# Patient Record
Sex: MALE | Race: WHITE | NOT HISPANIC OR LATINO | Employment: FULL TIME | URBAN - METROPOLITAN AREA
[De-identification: names, ages, dates, MRNs, and addresses within clinical notes are randomized per-mention and may not be internally consistent; named-entity substitution may affect disease eponyms.]

---

## 2021-05-25 LAB — CRC RECOMMENDATION EXT: NORMAL

## 2023-01-30 ENCOUNTER — OFFICE VISIT (OUTPATIENT)
Dept: PRIMARY CARE CLINIC | Facility: CLINIC | Age: 63
End: 2023-01-30
Payer: COMMERCIAL

## 2023-01-30 VITALS
HEART RATE: 72 BPM | BODY MASS INDEX: 25.74 KG/M2 | TEMPERATURE: 99 F | OXYGEN SATURATION: 98 % | SYSTOLIC BLOOD PRESSURE: 112 MMHG | DIASTOLIC BLOOD PRESSURE: 70 MMHG | HEIGHT: 70 IN | WEIGHT: 179.81 LBS

## 2023-01-30 DIAGNOSIS — R73.9 HIGH BLOOD SUGAR: ICD-10-CM

## 2023-01-30 DIAGNOSIS — Z11.3 SCREEN FOR STD (SEXUALLY TRANSMITTED DISEASE): ICD-10-CM

## 2023-01-30 DIAGNOSIS — Z11.59 ENCOUNTER FOR HEPATITIS C SCREENING TEST FOR LOW RISK PATIENT: ICD-10-CM

## 2023-01-30 DIAGNOSIS — E78.2 MIXED HYPERLIPIDEMIA: Primary | ICD-10-CM

## 2023-01-30 DIAGNOSIS — E03.9 PRIMARY HYPOTHYROIDISM: ICD-10-CM

## 2023-01-30 PROCEDURE — 99213 PR OFFICE/OUTPT VISIT, EST, LEVL III, 20-29 MIN: ICD-10-PCS | Mod: S$GLB,,, | Performed by: INTERNAL MEDICINE

## 2023-01-30 PROCEDURE — 99213 OFFICE O/P EST LOW 20 MIN: CPT | Mod: S$GLB,,, | Performed by: INTERNAL MEDICINE

## 2023-01-30 PROCEDURE — 3074F PR MOST RECENT SYSTOLIC BLOOD PRESSURE < 130 MM HG: ICD-10-PCS | Mod: S$GLB,,, | Performed by: INTERNAL MEDICINE

## 2023-01-30 PROCEDURE — 3008F BODY MASS INDEX DOCD: CPT | Mod: S$GLB,,, | Performed by: INTERNAL MEDICINE

## 2023-01-30 PROCEDURE — 1160F RVW MEDS BY RX/DR IN RCRD: CPT | Mod: S$GLB,,, | Performed by: INTERNAL MEDICINE

## 2023-01-30 PROCEDURE — 1159F MED LIST DOCD IN RCRD: CPT | Mod: S$GLB,,, | Performed by: INTERNAL MEDICINE

## 2023-01-30 PROCEDURE — 3074F SYST BP LT 130 MM HG: CPT | Mod: S$GLB,,, | Performed by: INTERNAL MEDICINE

## 2023-01-30 PROCEDURE — 3078F PR MOST RECENT DIASTOLIC BLOOD PRESSURE < 80 MM HG: ICD-10-PCS | Mod: S$GLB,,, | Performed by: INTERNAL MEDICINE

## 2023-01-30 PROCEDURE — 1159F PR MEDICATION LIST DOCUMENTED IN MEDICAL RECORD: ICD-10-PCS | Mod: S$GLB,,, | Performed by: INTERNAL MEDICINE

## 2023-01-30 PROCEDURE — 3078F DIAST BP <80 MM HG: CPT | Mod: S$GLB,,, | Performed by: INTERNAL MEDICINE

## 2023-01-30 PROCEDURE — 3008F PR BODY MASS INDEX (BMI) DOCUMENTED: ICD-10-PCS | Mod: S$GLB,,, | Performed by: INTERNAL MEDICINE

## 2023-01-30 PROCEDURE — 1160F PR REVIEW ALL MEDS BY PRESCRIBER/CLIN PHARMACIST DOCUMENTED: ICD-10-PCS | Mod: S$GLB,,, | Performed by: INTERNAL MEDICINE

## 2023-01-30 RX ORDER — SIMVASTATIN 20 MG/1
20 TABLET, FILM COATED ORAL NIGHTLY
COMMUNITY
Start: 2022-12-13 | End: 2023-07-11 | Stop reason: SDUPTHER

## 2023-01-30 RX ORDER — DICLOFENAC SODIUM 10 MG/G
1 GEL TOPICAL 4 TIMES DAILY PRN
COMMUNITY
End: 2023-07-11 | Stop reason: SDUPTHER

## 2023-01-30 RX ORDER — ACYCLOVIR 400 MG/1
400 TABLET ORAL 2 TIMES DAILY
COMMUNITY
Start: 2023-01-21 | End: 2023-04-04

## 2023-01-30 RX ORDER — LEVOTHYROXINE SODIUM 50 UG/1
50 TABLET ORAL
COMMUNITY
End: 2023-07-11 | Stop reason: SDUPTHER

## 2023-01-30 RX ORDER — FINASTERIDE 1 MG/1
1 TABLET, FILM COATED ORAL DAILY
COMMUNITY
End: 2024-01-16 | Stop reason: SDUPTHER

## 2023-01-30 RX ORDER — CELECOXIB 200 MG/1
200 CAPSULE ORAL DAILY
COMMUNITY
End: 2023-03-07 | Stop reason: SDUPTHER

## 2023-01-30 RX ORDER — SILDENAFIL 100 MG/1
100 TABLET, FILM COATED ORAL 2 TIMES DAILY PRN
COMMUNITY

## 2023-01-30 RX ORDER — LIDOCAINE 50 MG/G
1 OINTMENT TOPICAL
COMMUNITY
End: 2023-07-11 | Stop reason: SDUPTHER

## 2023-01-30 RX ORDER — GABAPENTIN 300 MG/1
300 CAPSULE ORAL NIGHTLY
COMMUNITY
End: 2023-06-12 | Stop reason: SDUPTHER

## 2023-01-30 RX ORDER — MINOXIDIL 10 MG/1
1 TABLET ORAL DAILY
COMMUNITY
Start: 2022-12-23 | End: 2024-01-16

## 2023-01-31 NOTE — PROGRESS NOTES
Subjective:       Patient ID: Mitchell Hernandez is a 62 y.o. male.    Chief Complaint: Establish Care (Patient presents for re-establishing care and medication refills.)      Mr Hernandez :  is an established patient who presents today for a f/u visit on thyroid & lipids problems    Thyroid: Patient presents for evaluation of hypothyroidism. Current symptoms include fatigue. Patient denies weight gain.             Review of Systems   Constitutional:  Negative for activity change, appetite change and fever.   Respiratory: Negative.     Cardiovascular: Negative.    Gastrointestinal: Negative.    Musculoskeletal: Negative.        Objective:      Physical Exam  Constitutional:       Appearance: Normal appearance.   Cardiovascular:      Rate and Rhythm: Normal rate and regular rhythm.      Pulses: Normal pulses.      Heart sounds: Normal heart sounds. No murmur heard.    No friction rub. No gallop.   Pulmonary:      Effort: Pulmonary effort is normal.      Breath sounds: Normal breath sounds. No wheezing.   Abdominal:      General: Abdomen is flat. Bowel sounds are normal.      Palpations: Abdomen is soft.   Musculoskeletal:         General: Normal range of motion.   Skin:     General: Skin is warm and dry.   Neurological:      General: No focal deficit present.      Mental Status: He is alert and oriented to person, place, and time.   Psychiatric:         Mood and Affect: Mood normal.       Assessment:       1. Mixed hyperlipidemia  Overview:  Dyslipidemia: Patient presents for evaluation of lipids.  Compliance with treatment thus far has been good.  A repeat fasting lipid profile was done.  The patient does use medications that may worsen dyslipidemias (corticosteroids, progestins, anabolic steroids, diuretics, beta-blockers, amiodarone, cyclosporine, olanzapine). The patient exercises every other day.    The patient is not known to have coexisting coronary artery disease.     Cardiac Risk Factors  Age > 45-male, >  55-female:  YES  +1   Smoking:   No   Sig. family hx of CHD*:  NO   Hypertension:   NO   Diabetes:   NO   HDL < 35:   NO   HDL > 59:   NO   Total: 1   *- Sig. family h/o CHD per NCEP = MI or sudden death at <56yo in   father or other 1st-degree male relative, or <64yo in mother or   other 1st-degree female relative        Assessment & Plan:  Exercise , Diet d/w pt  Recheck lipids in 3M    Orders:  -     Lipid Panel; Future; Expected date: 01/30/2023  -     Comprehensive Metabolic Panel; Future; Expected date: 01/30/2023    2. Primary hypothyroidism  Overview:  Thyroid: Patient presents for evaluation of hypothyroidism. Current symptoms include fatigue. Patient denies denies fatigue, weight changes, heat/cold intolerance, bowel/skin changes or CVS symptoms.         Assessment & Plan:  Cont with synthroid 50 mcg  Recheck thyroid function tests in 3 months    Orders:  -     TSH; Future; Expected date: 01/30/2023    3. High blood sugar  -     Hemoglobin A1C; Future; Expected date: 01/30/2023  -     Comprehensive Metabolic Panel; Future; Expected date: 01/30/2023    4. Encounter for hepatitis C screening test for low risk patient  -     Hepatitis C Antibody; Future; Expected date: 01/30/2023    5. Screen for STD (sexually transmitted disease)  -     HIV 1/2 Ag/Ab (4th Gen); Future; Expected date: 07/30/2023             Plan:       1. Mixed hyperlipidemia  Overview:  Dyslipidemia: Patient presents for evaluation of lipids.  Compliance with treatment thus far has been good.  A repeat fasting lipid profile was done.  The patient does use medications that may worsen dyslipidemias (corticosteroids, progestins, anabolic steroids, diuretics, beta-blockers, amiodarone, cyclosporine, olanzapine). The patient exercises every other day.    The patient is not known to have coexisting coronary artery disease.     Cardiac Risk Factors  Age > 45-male, > 55-female:  YES  +1   Smoking:   No   Sig. family hx of CHD*:  NO   Hypertension:    NO   Diabetes:   NO   HDL < 35:   NO   HDL > 59:   NO   Total: 1   *- Sig. family h/o CHD per NCEP = MI or sudden death at <54yo in   father or other 1st-degree male relative, or <64yo in mother or   other 1st-degree female relative        Assessment & Plan:  Exercise , Diet d/w pt  Recheck lipids in 3M    Orders:  -     Lipid Panel; Future; Expected date: 01/30/2023  -     Comprehensive Metabolic Panel; Future; Expected date: 01/30/2023    2. Primary hypothyroidism  Overview:  Thyroid: Patient presents for evaluation of hypothyroidism. Current symptoms include fatigue. Patient denies denies fatigue, weight changes, heat/cold intolerance, bowel/skin changes or CVS symptoms.         Assessment & Plan:  Cont with synthroid 50 mcg  Recheck thyroid function tests in 3 months    Orders:  -     TSH; Future; Expected date: 01/30/2023    3. High blood sugar  -     Hemoglobin A1C; Future; Expected date: 01/30/2023  -     Comprehensive Metabolic Panel; Future; Expected date: 01/30/2023    4. Encounter for hepatitis C screening test for low risk patient  -     Hepatitis C Antibody; Future; Expected date: 01/30/2023    5. Screen for STD (sexually transmitted disease)  -     HIV 1/2 Ag/Ab (4th Gen); Future; Expected date: 07/30/2023    I spent a total of 31 minutes on the day of the visit.This includes face to face time and non-face to face time preparing to see the patient (eg, review of tests), obtaining and/or reviewing separately obtained history, documenting clinical information in the electronic or other health record, independently interpreting results and communicating results to the patient/family/caregiver, or care coordinator.

## 2023-01-31 NOTE — PATIENT INSTRUCTIONS
Get lipids , hep C & HIV at [facility]     C-scope : performed in 2021 by [facility] in [facility]    Annual eye exams : done by Dr Deng    He's up to date : with COVID, FLU , shingles & Tdap

## 2023-02-01 ENCOUNTER — PATIENT MESSAGE (OUTPATIENT)
Dept: ADMINISTRATIVE | Facility: HOSPITAL | Age: 63
End: 2023-02-01
Payer: COMMERCIAL

## 2023-02-09 ENCOUNTER — PATIENT OUTREACH (OUTPATIENT)
Dept: ADMINISTRATIVE | Facility: HOSPITAL | Age: 63
End: 2023-02-09
Payer: COMMERCIAL

## 2023-02-09 NOTE — PROGRESS NOTES
Population Health Outreach.    REQUESTED COLONOSCOPY FROM [facility]    LABS SCHEDULED FOR 3 MONTHS

## 2023-02-09 NOTE — LETTER
"                      FAX      AUTHORIZATION FOR RELEASE OF   CONFIDENTIAL INFORMATION        [facility]        We are seeing Mitchell Hernandez, date of birth 1960, in the clinic at [facility]. Toni Leigh MD is the patient's PCP. Mitchell Hernandez has an outstanding lab/procedure at the time we reviewed their chart. In order to help keep their health information updated, Mitchell Hernandez has authorized us to request the following medical record(s):            ( X )  DIABETIC EYE EXAM (WITHIN 48 MONTHS)              Please fax records to [facility]  [phone]        KAILA SPENCER LPN  CLINICAL CARE COORDINATOR   [facility]  PHONE: [phone]  FAX: [phone]                         A. Consent for Examination and Treatment: I hereby authorize the providers and employees of [facility] to provide medical treatment/services which includes, but is not limited to, performing and administering tests and diagnostic procedures that are deemed necessary, Including, but not limited to, imaging examinations, blood tests and other laboratory procedures as may be required by the hospital, clinic, or may be ordered by my physician(s) or persons working under the general and/or special instructions of my physician(s).                   1.      I understand and agree that this consent covers all authorized persons, including but not limited to physicians, residents, nurse practitioners, physicians' assistants, specialists, consultants, student nurses, and independently contracted physicians, who are called upon by the physician in charge, to carry out the diagnostic procedures and medical or surgical treatment.  2.      I hereby authorize [facility] to retain or dispose of any specimens or tissue, should there be such remaining from any test or procedure.  3.      I hereby authorize and give consent for [facility] providers and employees to take " photographs, images or videotapes of such diagnostic, surgical or treatment procedures of Patient as may be required by [facility] or as may be ordered by a physician.  I further acknowledge and agree that [facility] may use cameras or other devices for patient monitoring.  4.      I am aware that the practice of medicine is not an exact science, and I acknowledge that no guarantees have been made to me as to the outcome of any tests, procedures or treatment.                   B. Authorization for Release of Information:  I understand that my insurance company and/or their agents may need information necessary to make determinations about payment/reimbursement.  I hereby provide authorization to release to all insurance companies, their successors, assignees, other parties with whom they may have contracted, or others acting on their behalf, that are involved with payment for any hospital and/or clinic charges incurred by the patient, any information that they request and deem necessary for payment/reimbursement, and/or quality review.  I further authorize the release of my health information to physicians or other health care practitioners on staff who are involved in my health care now and in the future, and to other health care providers, entities, or institutions for the purpose of my continued care and treatment, including referrals.     C. Medicare Patient's Certification and Authorization to Release Information and Payment Request:  I certify that the information given by me in applying for payment under Title XVIII of the Social Security Act is correct.  I authorize any cornelius of medical or other information about me to release to the Social Security Administration, or its intermediaries or carriers, any information needed for this or a related Medicare claim.  I request that payment of authorized benefits be made on my behalf.     D. Assignment of Insurance Benefits:  I hereby authorize any and all insurance  companies, health plans, defined benefit plans, health insurers or any entity that is or may be responsible for payment of my medical expenses to pay all hospital and medical benefits now due, and to become due and payable to me under any hospital benefits, sick benefits, injury benefits or any other benefit for services rendered to me, including Major Medical Benefits, direct to [facility] and all independently contracted physicians.  I assign any and all rights that I may have against any and all insurance companies, health plans, defined benefit plans, health insurers or any entity that is or may be responsible for payment of my medical expenses, including, but not limited to any right to appeal a denial of a claim, any right to bring any action, lawsuit, administrative proceeding, or other cause of action on my behalf.  I specifically assign my right to pursue litigation against any and all insurance companies, health plans, defined benefit plans, health insurers or any entity that is or may be responsible for payment of medical expenses based upon a refusal to pay charges.              REGISTRATION  AUTHORIZATION                    E. Valuables:  It is understood and agreed that [facility] is not liable for the damage to or loss of any money, jewelry, documents, dentures, eye glasses, hearing aids, prosthetics, or other property of value.     F. Computer Equipment:  I understand and agree that should I choose to use computer equipment owned by [facility] or if I choose to access the Internet via [facility]'s network, I do so at my own risk.  [facility] is not responsible for any damage to my computer equipment or to any damages of any type that might arise from my loss of equipment or data.                   G. Acceptance of Financial Responsibility:  I agree that in consideration of the services and supplies that have been or will be furnished to the patient,  I am hereby obligated to pay all charges made for or on the  account of the patient according to the standard rates (in effect at the time the services and supplies are delivered) established by [facility], including its Patient Financial Assistance Policy to the extent it is applicable.  I understand that I am responsible for all charges, or portions thereof, not covered by insurance or other sources.  Patient refunds will be distributed only after balances at all [facility] facilities are paid.                   H. Communication Authorization:  I hereby authorize [facility] and its representatives, along with any billing service or  who may work on their behalf, to contact me on my cell phone and/or home phone using prerecorded messages, artificial voice messages, automatic telephone dialing devices or other computer assisted technology, or by electronic mail, text messaging, or by any other form of electronic communication.  This includes, but is not limited to appointment reminders, yearly physical exam reminders, preventive care reminders, patient campaigns, welcome calls, and calls about account balances on my account or any account on which I am listed as a guarantor.  I understand I have the right to opt out of these communications at any time.     I.  Relationship Between Facility and Physician:  I understand that some, but not all, providers furnishing services to the patient are not employees or agents of [facility].  The patient is under the care and supervision of his/her attending physician, and it is the responsibility of the facility and its nursing staff to carry out the instructions of such physicians.  It is the responsibility of the patient's physician/designee to obtain the patient's informed consent, when required, for medical or surgical treatment, special diagnostic or therapeutic procedures, or hospital services rendered for the patient under the special instructions of the physician/designee.     J. Notice of Private Practices:  I acknowledge I  have received a copy of [facility]'s Notice of Privacy Practices.     K. Facility Directory:  I have discussed with the organization my desire to be either included or excluded in the facility directory.  I understand that if my choice is to opt-out of being identified in the facility directory that the facility will not provide any information about me such as my condition (e.g. Fair, stable, etc.) or my location in the facility (e.g. Room number, department).     L. LINKS:  For [place] Residents:  [facility] is a LINKS ([place] [facility]) participating facility.  LINKS is a [facility]-sponsored confidential computer system that helps you and your doctor keep track of you and your child's immunization history.  I acknowledge that I am allowing [facility] to share this information with [facility].  For [place] Residents:  [facility] is a MIIX ([place] Immunization Information eXchange) participant.  MINeurovance is a [place] Department of Health-sponsored confidential computer system that helps you and your doctor keep track of you and your child's immunization history.  I acknowledge that I am allowing [facility] to share information with [employer].     M. Term:  This authorization is valid for this and subsequent care/treatment I receive at [facility] and will remain valid unless/until revoked in writing by me.      ACKNOWLEDGMENT OF POTENTIAL RISKS OF COVID-19 VACCINE  It is important that you, as the patient or patients legally authorized representative(s), understand and acknowledge the following, with regard to administration of the COVID-19 vaccine offered by [facility]:  The SARS-CoV-2 virus (COVID-19) has caused an unprecedented modern global pandemic that has mobilized scientists and drug manufacturers to work to create safe and effective vaccines to get the crisis under control.  No vaccine is released in the [place] without undergoing rigorous, multi-layered testing and approval by the  Food and Drug Administration.  During a public health emergency, however, vaccines can be released for patient administration by the FDA prior to completion of multi-phase clinical trials and approval. This is done by the FDAs granting of Emergency Use Authorization (EUA) when the vaccine meets reasonable thresholds for safety and effectiveness and people are in urgent need of care. Under an EUA, the FDA has found that known and potential benefits outweigh its known and potential risks.  The vaccine for which you are presenting to [facility] has been released under an EUA, which [facility] is honoring in its distribution of the vaccine to the public. While the FDAs authorization indicates its belief that usage is recommended over possible risks, there is still the possibility that unknown risks of the vaccine could exist.  By signing this document, you acknowledge and assume these risks. Further, you waive any and all claims of liability against and hold harmless any [facility] entity or provider for any harm caused to you by said possible unknown risks of the vaccine.        N. [facility]:  As used in this document, [facility] means all [facility] affiliated entities including all health centers, surgery centers, clinics, and hospitals.  It includes more specifically, the following entities: [facility], [facility] profit [facility], and its subsidiaries and affiliates, including [facility], [facility], [facility], [facility], [facility]., [facility]/b/[facility], L[facility]C.d/b/a [facility] [employer] As manager of [facility], [facility] [employer], L.L.C.d/b/a [facility], [facility] and [facility] [employer] [facility] as manager of [facility].                                                                Patient/Legal Guardian Signature                                                    This signature was collected at 01/30/2023      KERI ANDREWS/Self                                                                            Printed Name/Relationship to Patient                                                [facility] complies with applicable Federal civil rights laws and does not discriminate on the basis of race, color, national origin, age, disability, or sex.          ATENCIÓN: si habla español, tiene a stern disposición servicios gratuitos de asistencia lingüística. Dolores salazar [phone].         CHÚ Ý: N?u b?n nói Ti?ng Vi?t, có các d?ch v? h? tr? ngôn ng? mi?n phí dành cho b?n. G?i s? [phone].              REGISTRATION  AUTHORIZATION

## 2023-02-09 NOTE — LETTER
"                      FAX      AUTHORIZATION FOR RELEASE OF   CONFIDENTIAL INFORMATION        Dr. Bernheim      We are seeing Mitchell Hernandez, date of birth 1960, in the clinic at [facility]. Toni Leigh MD is the patient's PCP. Mitchell Hernandez has an outstanding lab/procedure at the time we reviewed their chart. In order to help keep their health information updated, Mitchell Hernandez has authorized us to request the following medical record(s):         ( X)  COLONOSCOPY (WITHIN 10 YRS)           Please fax records to [facility]  [phone]        KAILA SPENCER LPN  CLINICAL CARE COORDINATOR   [facility]  PHONE: [phone]  FAX: [phone]                         A. Consent for Examination and Treatment: I hereby authorize the providers and employees of [facility] to provide medical treatment/services which includes, but is not limited to, performing and administering tests and diagnostic procedures that are deemed necessary, Including, but not limited to, imaging examinations, blood tests and other laboratory procedures as may be required by the hospital, clinic, or may be ordered by my physician(s) or persons working under the general and/or special instructions of my physician(s).                   1.      I understand and agree that this consent covers all authorized persons, including but not limited to physicians, residents, nurse practitioners, physicians' assistants, specialists, consultants, student nurses, and independently contracted physicians, who are called upon by the physician in charge, to carry out the diagnostic procedures and medical or surgical treatment.  2.      I hereby authorize [facility] to retain or dispose of any specimens or tissue, should there be such remaining from any test or procedure.  3.      I hereby authorize and give consent for [facility] providers and employees to take photographs, images or " videotapes of such diagnostic, surgical or treatment procedures of Patient as may be required by [facility] or as may be ordered by a physician.  I further acknowledge and agree that [facility] may use cameras or other devices for patient monitoring.  4.      I am aware that the practice of medicine is not an exact science, and I acknowledge that no guarantees have been made to me as to the outcome of any tests, procedures or treatment.                   B. Authorization for Release of Information:  I understand that my insurance company and/or their agents may need information necessary to make determinations about payment/reimbursement.  I hereby provide authorization to release to all insurance companies, their successors, assignees, other parties with whom they may have contracted, or others acting on their behalf, that are involved with payment for any hospital and/or clinic charges incurred by the patient, any information that they request and deem necessary for payment/reimbursement, and/or quality review.  I further authorize the release of my health information to physicians or other health care practitioners on staff who are involved in my health care now and in the future, and to other health care providers, entities, or institutions for the purpose of my continued care and treatment, including referrals.     C. Medicare Patient's Certification and Authorization to Release Information and Payment Request:  I certify that the information given by me in applying for payment under Title XVIII of the Social Security Act is correct.  I authorize any cornelius of medical or other information about me to release to the Social Security Administration, or its intermediaries or carriers, any information needed for this or a related Medicare claim.  I request that payment of authorized benefits be made on my behalf.     D. Assignment of Insurance Benefits:  I hereby authorize any and all insurance companies, health plans,  defined benefit plans, health insurers or any entity that is or may be responsible for payment of my medical expenses to pay all hospital and medical benefits now due, and to become due and payable to me under any hospital benefits, sick benefits, injury benefits or any other benefit for services rendered to me, including Major Medical Benefits, direct to [facility] and all independently contracted physicians.  I assign any and all rights that I may have against any and all insurance companies, health plans, defined benefit plans, health insurers or any entity that is or may be responsible for payment of my medical expenses, including, but not limited to any right to appeal a denial of a claim, any right to bring any action, lawsuit, administrative proceeding, or other cause of action on my behalf.  I specifically assign my right to pursue litigation against any and all insurance companies, health plans, defined benefit plans, health insurers or any entity that is or may be responsible for payment of medical expenses based upon a refusal to pay charges.              REGISTRATION  AUTHORIZATION                    E. Valuables:  It is understood and agreed that [facility] is not liable for the damage to or loss of any money, jewelry, documents, dentures, eye glasses, hearing aids, prosthetics, or other property of value.     F. Computer Equipment:  I understand and agree that should I choose to use computer equipment owned by [facility] or if I choose to access the Internet via [facility]'s network, I do so at my own risk.  [facility] is not responsible for any damage to my computer equipment or to any damages of any type that might arise from my loss of equipment or data.                   G. Acceptance of Financial Responsibility:  I agree that in consideration of the services and supplies that have been or will be furnished to the patient,  I am hereby obligated to pay all charges made for or on the account of the patient  according to the standard rates (in effect at the time the services and supplies are delivered) established by [facility], including its Patient Financial Assistance Policy to the extent it is applicable.  I understand that I am responsible for all charges, or portions thereof, not covered by insurance or other sources.  Patient refunds will be distributed only after balances at all [facility] facilities are paid.                   H. Communication Authorization:  I hereby authorize [facility] and its representatives, along with any billing service or  who may work on their behalf, to contact me on my cell phone and/or home phone using prerecorded messages, artificial voice messages, automatic telephone dialing devices or other computer assisted technology, or by electronic mail, text messaging, or by any other form of electronic communication.  This includes, but is not limited to appointment reminders, yearly physical exam reminders, preventive care reminders, patient campaigns, welcome calls, and calls about account balances on my account or any account on which I am listed as a guarantor.  I understand I have the right to opt out of these communications at any time.     I.  Relationship Between Facility and Physician:  I understand that some, but not all, providers furnishing services to the patient are not employees or agents of [facility].  The patient is under the care and supervision of his/her attending physician, and it is the responsibility of the facility and its nursing staff to carry out the instructions of such physicians.  It is the responsibility of the patient's physician/designee to obtain the patient's informed consent, when required, for medical or surgical treatment, special diagnostic or therapeutic procedures, or hospital services rendered for the patient under the special instructions of the physician/designee.     J. Notice of Private Practices:  I acknowledge I have received a copy of  [facility]'s Notice of Privacy Practices.     K. Facility Directory:  I have discussed with the organization my desire to be either included or excluded in the facility directory.  I understand that if my choice is to opt-out of being identified in the facility directory that the facility will not provide any information about me such as my condition (e.g. Fair, stable, etc.) or my location in the facility (e.g. Room number, department).     L. LINKS:  For [place] Residents:  [facility] is a LINKS ([place] [facility]) participating facility.  [facility] is a [facility]-sponsored confidential computer system that helps you and your doctor keep track of you and your child's immunization history.  I acknowledge that I am allowing [facility] to share this information with [facility].  For [place] Residents:  [facility] is a MIIX ([place] Immunization Information eXchange) participant.  [facility] is a [place] Department of Health-sponsored confidential computer system that helps you and your doctor keep track of you and your child's immunization history.  I acknowledge that I am allowing [facility] to share information with [facility].     M. Term:  This authorization is valid for this and subsequent care/treatment I receive at [facility] and will remain valid unless/until revoked in writing by me.      ACKNOWLEDGMENT OF POTENTIAL RISKS OF COVID-19 VACCINE  It is important that you, as the patient or patients legally authorized representative(s), understand and acknowledge the following, with regard to administration of the COVID-19 vaccine offered by [facility]:  The SARS-CoV-2 virus (COVID-19) has caused an unprecedented modern global pandemic that has mobilized scientists and drug manufacturers to work to create safe and effective vaccines to get the crisis under control.  No vaccine is released in the [place] without undergoing rigorous, multi-layered testing and approval by the Food and Drug  Administration.  During a public health emergency, however, vaccines can be released for patient administration by the FDA prior to completion of multi-phase clinical trials and approval. This is done by the FDAs granting of Emergency Use Authorization (EUA) when the vaccine meets reasonable thresholds for safety and effectiveness and people are in urgent need of care. Under an EUA, the FDA has found that known and potential benefits outweigh its known and potential risks.  The vaccine for which you are presenting to [facility] has been released under an EUA, which [facility] is honoring in its distribution of the vaccine to the public. While the FDAs authorization indicates its belief that usage is recommended over possible risks, there is still the possibility that unknown risks of the vaccine could exist.  By signing this document, you acknowledge and assume these risks. Further, you waive any and all claims of liability against and hold harmless any [facility] entity or provider for any harm caused to you by said possible unknown risks of the vaccine.        N. [facility]:  As used in this document, [facility] means all [facility] affiliated entities including all health centers, surgery centers, clinics, and hospitals.  It includes more specifically, the following entities: [facility], a not for profit [facility], and its subsidiaries and affiliates, including [facility], [facility], [facility], L.LMackenzieC., [facility], [facility], [facility], LMackenzieLMackenzieC., [facility], L.LMackenzieC., [facility] LMackenzieLJAMI.d/b/a [facility], LMakcenzieLJAMI.d/b/a [facility] [employer] As manager of [facility], [facility], [facility]ackenzieLSt. [facility] [employer]  [employer], L.L.C.d/b/a [facility], [facility]., [facility], L.L.C., and [facility] as manager of [facility].                                                                Patient/Legal Guardian Signature                                                    This signature was collected at 01/30/2023      KERI ANDREWS/Self                                                                            Printed Name/Relationship to Patient                                                [facility] complies with applicable Federal civil rights laws and does not discriminate on the basis of race, color, national origin, age, disability, or sex.          ATENCIÓN: si habla español, tiene a stern disposición servicios gratuitos de asistencia lingüística. Dolores salazar [phone].         CHÚ Ý: N?u b?n nói Ti?ng Vi?t, có các d?ch v? h? tr? ngôn ng? mi?n phí dành cho b?n. G?i s? [phone].              REGISTRATION  AUTHORIZATION

## 2023-02-10 ENCOUNTER — PATIENT OUTREACH (OUTPATIENT)
Dept: ADMINISTRATIVE | Facility: HOSPITAL | Age: 63
End: 2023-02-10
Payer: COMMERCIAL

## 2023-02-22 ENCOUNTER — TELEPHONE (OUTPATIENT)
Dept: FAMILY MEDICINE | Facility: CLINIC | Age: 63
End: 2023-02-22
Payer: COMMERCIAL

## 2023-02-22 RX ORDER — ZOLPIDEM TARTRATE 10 MG/1
10 TABLET ORAL NIGHTLY PRN
Qty: 30 TABLET | Refills: 2 | Status: SHIPPED | OUTPATIENT
Start: 2023-02-22 | End: 2023-06-12 | Stop reason: SDUPTHER

## 2023-02-22 RX ORDER — ZOLPIDEM TARTRATE 10 MG/1
10 TABLET ORAL NIGHTLY PRN
COMMUNITY
Start: 2023-01-09 | End: 2023-02-22 | Stop reason: SDUPTHER

## 2023-02-22 NOTE — TELEPHONE ENCOUNTER
See message below from patient concerning refill of zolpidem. Not in patients record. Please review and advise. LOV 01/30/23

## 2023-02-22 NOTE — TELEPHONE ENCOUNTER
----- Message from Marisol Ezequiel sent at 2/22/2023  8:02 AM CST -----  Contact: PT  Type:  RX Refill Request    Who Called: PT   Refill or New Rx: REFILL   RX Name and Strength: ZOLPIDEM  PARTRATE 10 MG  How is the patient currently taking it? (ex. 1XDay): ONCE A DAY  Is this a 30 day or 90 day RX:30  Preferred Pharmacy with phone number:   [facility]/pharmacy #[id number] - [place], [place] - [address] [place] [address] AT [address] [address]  Phone: [phone] Fax: [phone]    Local or Mail Order:LOCAL   Ordering Provider:WYATT  Would the patient rather a call back or a response via [facility]? CALL   Best Call Back Number: [phone] (home)     Additional Information: PT DOES NOT SEE THIS MEDICATION ON HIS PROFILE AND WOULD ALSO LIKE 2 ADDITIONAL REFILLS

## 2023-03-07 RX ORDER — CELECOXIB 200 MG/1
200 CAPSULE ORAL DAILY
Qty: 30 CAPSULE | Refills: 0 | Status: SHIPPED | OUTPATIENT
Start: 2023-03-07 | End: 2023-04-10 | Stop reason: SDUPTHER

## 2023-03-07 NOTE — TELEPHONE ENCOUNTER
----- Message from Tran Vizcarra sent at 3/7/2023  3:21 PM CST -----  Contact: Patient  Type:  RX Refill Request    Who Called: Patient     Refill or New Rx:refill    RX Name and Strength:celecoxib (CELEBREX) 200 MG capsule      How is the patient currently taking it? (ex. 1XDay):1 x day '    Is this a 30 day or 90 day RX:90    Preferred Pharmacy with phone number:  [facility]/pharmacy #[id number] - [place], [place] - [address] [place] [address] AT [address] [address]  Phone: [phone] Fax: [phone]      Local or Mail Order:Local     Ordering Provider:Dada    Would the patient rather a call back or a response via [facility]? Call    Best Call Back Number:[phone] (home)     Additional Information: Patient requested refill

## 2023-03-27 ENCOUNTER — PATIENT MESSAGE (OUTPATIENT)
Dept: LAB | Facility: CLINIC | Age: 63
End: 2023-03-27

## 2023-04-04 ENCOUNTER — OFFICE VISIT (OUTPATIENT)
Dept: PRIMARY CARE CLINIC | Facility: CLINIC | Age: 63
End: 2023-04-04
Payer: COMMERCIAL

## 2023-04-04 VITALS
TEMPERATURE: 98 F | WEIGHT: 176.88 LBS | HEIGHT: 70 IN | DIASTOLIC BLOOD PRESSURE: 76 MMHG | SYSTOLIC BLOOD PRESSURE: 118 MMHG | OXYGEN SATURATION: 98 % | BODY MASS INDEX: 25.32 KG/M2 | HEART RATE: 58 BPM | RESPIRATION RATE: 16 BRPM

## 2023-04-04 DIAGNOSIS — Z00.00 WELLNESS EXAMINATION: ICD-10-CM

## 2023-04-04 DIAGNOSIS — Z11.59 ENCOUNTER FOR HEPATITIS C SCREENING TEST FOR LOW RISK PATIENT: ICD-10-CM

## 2023-04-04 DIAGNOSIS — A60.02 HERPES GENITALIS IN MEN: ICD-10-CM

## 2023-04-04 DIAGNOSIS — Z11.3 SCREEN FOR STD (SEXUALLY TRANSMITTED DISEASE): Primary | ICD-10-CM

## 2023-04-04 PROCEDURE — 3074F PR MOST RECENT SYSTOLIC BLOOD PRESSURE < 130 MM HG: ICD-10-PCS | Mod: S$GLB,,, | Performed by: INTERNAL MEDICINE

## 2023-04-04 PROCEDURE — 3074F SYST BP LT 130 MM HG: CPT | Mod: S$GLB,,, | Performed by: INTERNAL MEDICINE

## 2023-04-04 PROCEDURE — 1160F RVW MEDS BY RX/DR IN RCRD: CPT | Mod: S$GLB,,, | Performed by: INTERNAL MEDICINE

## 2023-04-04 PROCEDURE — 1159F MED LIST DOCD IN RCRD: CPT | Mod: S$GLB,,, | Performed by: INTERNAL MEDICINE

## 2023-04-04 PROCEDURE — 99396 PREV VISIT EST AGE 40-64: CPT | Mod: S$GLB,,, | Performed by: INTERNAL MEDICINE

## 2023-04-04 PROCEDURE — 3008F PR BODY MASS INDEX (BMI) DOCUMENTED: ICD-10-PCS | Mod: S$GLB,,, | Performed by: INTERNAL MEDICINE

## 2023-04-04 PROCEDURE — 3078F DIAST BP <80 MM HG: CPT | Mod: S$GLB,,, | Performed by: INTERNAL MEDICINE

## 2023-04-04 PROCEDURE — 3078F PR MOST RECENT DIASTOLIC BLOOD PRESSURE < 80 MM HG: ICD-10-PCS | Mod: S$GLB,,, | Performed by: INTERNAL MEDICINE

## 2023-04-04 PROCEDURE — 1160F PR REVIEW ALL MEDS BY PRESCRIBER/CLIN PHARMACIST DOCUMENTED: ICD-10-PCS | Mod: S$GLB,,, | Performed by: INTERNAL MEDICINE

## 2023-04-04 PROCEDURE — 3008F BODY MASS INDEX DOCD: CPT | Mod: S$GLB,,, | Performed by: INTERNAL MEDICINE

## 2023-04-04 PROCEDURE — 1159F PR MEDICATION LIST DOCUMENTED IN MEDICAL RECORD: ICD-10-PCS | Mod: S$GLB,,, | Performed by: INTERNAL MEDICINE

## 2023-04-04 PROCEDURE — 99396 PR PREVENTIVE VISIT,EST,40-64: ICD-10-PCS | Mod: S$GLB,,, | Performed by: INTERNAL MEDICINE

## 2023-04-04 RX ORDER — VALACYCLOVIR HYDROCHLORIDE 500 MG/1
500 TABLET, FILM COATED ORAL DAILY
Qty: 90 TABLET | Refills: 3 | Status: SHIPPED | OUTPATIENT
Start: 2023-04-04 | End: 2024-04-03

## 2023-04-04 NOTE — ASSESSMENT & PLAN NOTE
Patient had a recent colonoscope........  We are going to obtain his report  He is up-to-date regarding his tetanus shingles COVID and flu vaccines  Today I am going to order his HIV is hep C test in addition to the lipids and A1c

## 2023-04-04 NOTE — PROGRESS NOTES
Subjective:       Patient ID: Mitchell Hernandez is a 62 y.o. male.    Chief Complaint: Follow-up (3 Month - Pt states was to have labs prior to appt but was out of town and unable to get. Will reschedule.)      Patient is established already .......... presents today for a healthy you  visit , to discuss labs and for management of the chronic conditions basilia recurrent genital herpes    Patient presents for a wellness visit  Plus c/o recurrent herpes  Please refer to initial intake notes for screening/preventive measures  All histories and immunization schedule reviewed with patient            Follow-up  Pertinent negatives include no fever.   Review of Systems   Constitutional:  Negative for activity change, appetite change and fever.   Respiratory: Negative.     Cardiovascular: Negative.    Gastrointestinal: Negative.    Genitourinary:  Positive for genital sores.   Musculoskeletal: Negative.        Objective:      Physical Exam  Vitals and nursing note reviewed.   Constitutional:       Appearance: Normal appearance. He is obese.   Cardiovascular:      Rate and Rhythm: Normal rate and regular rhythm.      Pulses: Normal pulses.      Heart sounds: Normal heart sounds. No murmur heard.    No friction rub. No gallop.   Pulmonary:      Effort: Pulmonary effort is normal.      Breath sounds: Normal breath sounds. No wheezing.   Abdominal:      General: Abdomen is flat. Bowel sounds are normal.      Palpations: Abdomen is soft.   Genitourinary:     Comments: deferred  Musculoskeletal:         General: Normal range of motion.   Skin:     General: Skin is warm and dry.   Neurological:      General: No focal deficit present.      Mental Status: He is alert and oriented to person, place, and time.   Psychiatric:         Mood and Affect: Mood normal.       Assessment:       1. Screen for STD (sexually transmitted disease)    2. Encounter for hepatitis C screening test for low risk patient    3. Herpes genitalis in  men  Overview:  With a recent flare    Assessment & Plan:  Change to once a day valtrex for more compliance      Other orders  -     valACYclovir (VALTREX) 500 MG tablet; Take 1 tablet (500 mg total) by mouth once daily.  Dispense: 90 tablet; Refill: 3             Plan:       1. Screen for STD (sexually transmitted disease)    2. Encounter for hepatitis C screening test for low risk patient    3. Herpes genitalis in men  Overview:  With a recent flare    Assessment & Plan:  Change to once a day valtrex for more compliance      Other orders  -     valACYclovir (VALTREX) 500 MG tablet; Take 1 tablet (500 mg total) by mouth once daily.  Dispense: 90 tablet; Refill: 3

## 2023-04-05 ENCOUNTER — PATIENT MESSAGE (OUTPATIENT)
Dept: LAB | Facility: HOSPITAL | Age: 63
End: 2023-04-05
Payer: COMMERCIAL

## 2023-04-06 ENCOUNTER — LAB VISIT (OUTPATIENT)
Dept: LAB | Facility: CLINIC | Age: 63
End: 2023-04-06
Payer: COMMERCIAL

## 2023-04-06 DIAGNOSIS — E03.9 PRIMARY HYPOTHYROIDISM: ICD-10-CM

## 2023-04-06 DIAGNOSIS — E78.2 MIXED HYPERLIPIDEMIA: ICD-10-CM

## 2023-04-06 DIAGNOSIS — Z11.3 SCREEN FOR STD (SEXUALLY TRANSMITTED DISEASE): ICD-10-CM

## 2023-04-06 DIAGNOSIS — R73.9 HIGH BLOOD SUGAR: ICD-10-CM

## 2023-04-06 DIAGNOSIS — Z11.59 ENCOUNTER FOR HEPATITIS C SCREENING TEST FOR LOW RISK PATIENT: ICD-10-CM

## 2023-04-06 LAB
ALBUMIN SERPL BCP-MCNC: 4 G/DL (ref 3.5–5.2)
ALP SERPL-CCNC: 44 U/L (ref 55–135)
ALT SERPL W/O P-5'-P-CCNC: 16 U/L (ref 10–44)
ANION GAP SERPL CALC-SCNC: 5 MMOL/L (ref 8–16)
AST SERPL-CCNC: 18 U/L (ref 10–40)
BILIRUB SERPL-MCNC: 0.5 MG/DL (ref 0.1–1)
BUN SERPL-MCNC: 14 MG/DL (ref 8–23)
CALCIUM SERPL-MCNC: 9.1 MG/DL (ref 8.7–10.5)
CHLORIDE SERPL-SCNC: 107 MMOL/L (ref 95–110)
CHOLEST SERPL-MCNC: 150 MG/DL (ref 120–199)
CHOLEST/HDLC SERPL: 1.9 {RATIO} (ref 2–5)
CO2 SERPL-SCNC: 27 MMOL/L (ref 23–29)
CREAT SERPL-MCNC: 0.8 MG/DL (ref 0.5–1.4)
EST. GFR  (NO RACE VARIABLE): >60 ML/MIN/1.73 M^2
ESTIMATED AVG GLUCOSE: 105 MG/DL (ref 68–131)
GLUCOSE SERPL-MCNC: 101 MG/DL (ref 70–110)
HBA1C MFR BLD: 5.3 % (ref 4–5.6)
HDLC SERPL-MCNC: 79 MG/DL (ref 40–75)
HDLC SERPL: 52.7 % (ref 20–50)
LDLC SERPL CALC-MCNC: 64.2 MG/DL (ref 63–159)
NONHDLC SERPL-MCNC: 71 MG/DL
POTASSIUM SERPL-SCNC: 4.1 MMOL/L (ref 3.5–5.1)
PROT SERPL-MCNC: 7.1 G/DL (ref 6–8.4)
SODIUM SERPL-SCNC: 139 MMOL/L (ref 136–145)
TRIGL SERPL-MCNC: 34 MG/DL (ref 30–150)
TSH SERPL DL<=0.005 MIU/L-ACNC: 0.69 UIU/ML (ref 0.4–4)

## 2023-04-06 PROCEDURE — 80053 COMPREHEN METABOLIC PANEL: CPT | Performed by: INTERNAL MEDICINE

## 2023-04-06 PROCEDURE — 86803 HEPATITIS C AB TEST: CPT | Performed by: INTERNAL MEDICINE

## 2023-04-06 PROCEDURE — 84443 ASSAY THYROID STIM HORMONE: CPT | Performed by: INTERNAL MEDICINE

## 2023-04-06 PROCEDURE — 80061 LIPID PANEL: CPT | Performed by: INTERNAL MEDICINE

## 2023-04-06 PROCEDURE — 87389 HIV-1 AG W/HIV-1&-2 AB AG IA: CPT | Performed by: INTERNAL MEDICINE

## 2023-04-06 PROCEDURE — 36415 PR COLLECTION VENOUS BLOOD,VENIPUNCTURE: ICD-10-PCS | Mod: ,,, | Performed by: STUDENT IN AN ORGANIZED HEALTH CARE EDUCATION/TRAINING PROGRAM

## 2023-04-06 PROCEDURE — 36415 COLL VENOUS BLD VENIPUNCTURE: CPT | Mod: ,,, | Performed by: STUDENT IN AN ORGANIZED HEALTH CARE EDUCATION/TRAINING PROGRAM

## 2023-04-06 PROCEDURE — 83036 HEMOGLOBIN GLYCOSYLATED A1C: CPT | Performed by: INTERNAL MEDICINE

## 2023-04-07 LAB
HCV AB SERPL QL IA: NORMAL
HIV 1+2 AB+HIV1 P24 AG SERPL QL IA: NORMAL

## 2023-04-19 ENCOUNTER — PATIENT OUTREACH (OUTPATIENT)
Dept: ADMINISTRATIVE | Facility: HOSPITAL | Age: 63
End: 2023-04-19
Payer: COMMERCIAL

## 2023-06-09 ENCOUNTER — TELEPHONE (OUTPATIENT)
Dept: PRIMARY CARE CLINIC | Facility: CLINIC | Age: 63
End: 2023-06-09
Payer: COMMERCIAL

## 2023-06-09 NOTE — TELEPHONE ENCOUNTER
----- Message from Hans Matos sent at 6/9/2023  2:38 PM CDT -----  Regarding: Refill  Contact: patient  Type:  RX Refill Request    Who Called: Patient  Refill or New Rx:Refill  RX Name and Strength:gabapentin (NEURONTIN) 300 MG capsule and  MG tablet  How is the patient currently taking it? (ex. 1XDay):  Is this a 30 day or 90 day RX:  Preferred Pharmacy with phone number:  [facility]/pharmacy #[id number] - [place], [place] - [address] [place] [address] AT [address] [address]  Phone: [phone] Fax: [phone]  Local or Mail Order:local  Ordering Provider:Dada  Would the patient rather a call back or a response via [facility]? call  Best Call Back Number:[phone]  Additional Information: Patient called regarding a refill

## 2023-06-12 ENCOUNTER — TELEPHONE (OUTPATIENT)
Dept: PRIMARY CARE CLINIC | Facility: CLINIC | Age: 63
End: 2023-06-12
Payer: COMMERCIAL

## 2023-06-12 RX ORDER — GABAPENTIN 300 MG/1
300 CAPSULE ORAL NIGHTLY
Qty: 90 CAPSULE | Refills: 3 | Status: SHIPPED | OUTPATIENT
Start: 2023-06-12 | End: 2023-07-11 | Stop reason: SDUPTHER

## 2023-06-12 RX ORDER — ZOLPIDEM TARTRATE 10 MG/1
10 TABLET ORAL NIGHTLY PRN
Qty: 30 TABLET | Refills: 0 | Status: SHIPPED | OUTPATIENT
Start: 2023-06-12 | End: 2023-07-10 | Stop reason: SDUPTHER

## 2023-06-12 RX ORDER — ZOLPIDEM TARTRATE 10 MG/1
10 TABLET ORAL NIGHTLY PRN
Qty: 30 TABLET | Refills: 2 | Status: CANCELLED | OUTPATIENT
Start: 2023-06-12 | End: 2023-09-10

## 2023-06-12 NOTE — TELEPHONE ENCOUNTER
Russ Garland,  Patient is requesting medication refills on listed medications. Noted only two of the requested medications are in his chart. Patient is also requesting Zanaflex 4 mg takes two tablets at HS.  Please review and advise.  Signed:  Gaby Carvalho LPN    Last Office Visit :4/4/2023    ----- Message from Marisol Nieves sent at 6/12/2023  9:38 AM CDT -----  Type:  RX Refill Request    Who Called: PT    Refill or New Rx: 3 REFILLS     RX Name and Strength: gabapentin (NEURONTIN) 300 MG capsule  How is the patient currently taking it? (ex. 1XDay): TWO TABLETS ONCE A DAY  Is this a 30 day or 90 day RX: 90      How is the patient currently taking it? (ex. 1XDay): zolpidem (AMBIEN) 10 mg Tab  How is the patient currently taking it? (ex. 1XDay):  ONE TABLET AT NIGHT   Is this a 30 day or 90 day RX: 30 - PREFERS 90 IF POSSIBLE     How is the patient currently taking it? (ex. 1XDay): TIZANIDINE   How is the patient currently taking it? (ex. 1XDay): TWO TABLETS IN THE EVENING   Is this a 30 day or 90 day RX:30  PREFERS 90, IF POSSIBLE    Preferred Pharmacy with phone number:   [facility]/pharmacy #[id number] - [address] AT [address] [address]  Phone: [phone] Fax: [phone]  Local or Mail Order:LOCAL  Ordering Provider:RUDY  Would the patient rather a call back or a response via [facility]? CALL   Best Call Back Number:[phone] (home)     Additional Information: THANK YOU

## 2023-06-12 NOTE — TELEPHONE ENCOUNTER
----- Message from Hans Matos sent at 6/9/2023  2:38 PM CDT -----  Regarding: Refill  Contact: patient  Type:  RX Refill Request    Who Called: Patient  Refill or New Rx:Refill  RX Name and Strength:gabapentin (NEURONTIN) 300 MG capsule and  MG tablet  How is the patient currently taking it? (ex. 1XDay):  Is this a 30 day or 90 day RX:  Preferred Pharmacy with phone number:  [facility]/pharmacy #[id number] - [place], [place] - [address] [place] [address] AT [address] [address]  Phone: [phone] Fax: [phone]  Local or Mail Order:local  Ordering Provider:Dada  Would the patient rather a call back or a response via [facility]? call  Best Call Back Number:[phone]  Additional Information: Patient called regarding a refill

## 2023-07-10 PROBLEM — Z00.00 WELLNESS EXAMINATION: Status: RESOLVED | Noted: 2023-04-04 | Resolved: 2023-07-10

## 2023-07-10 RX ORDER — ZOLPIDEM TARTRATE 10 MG/1
10 TABLET ORAL NIGHTLY PRN
Qty: 30 TABLET | Refills: 0 | Status: SHIPPED | OUTPATIENT
Start: 2023-07-10 | End: 2023-07-11 | Stop reason: SDUPTHER

## 2023-07-10 NOTE — TELEPHONE ENCOUNTER
----- Message from Marisol Nieves sent at 7/10/2023  9:59 AM CDT -----  Type:  RX Refill Request    Who Called: PT  Refill or New Rx: REFILL   RX Name and Strength: zolpidem (AMBIEN) 10 mg Tab  How is the patient currently taking it? (ex. 1XDay): ONE TABLET AT BEDTIME   Is this a 30 day or 90 day RX: 30  Preferred Pharmacy with phone number:  [facility]/pharmacy #[id number] - [place], [place] - [address] [place] [address] AT [address] [address]  Phone: [phone] Fax: [phone]  Local or Mail Order:LOCAL  Ordering Provider: RUDY  Would the patient rather a call back or a response via [facility]? CALL   Best Call Back Number:[phone] (home)     Additional Information: THANK YOU

## 2023-07-11 ENCOUNTER — TELEPHONE (OUTPATIENT)
Dept: FAMILY MEDICINE | Facility: CLINIC | Age: 63
End: 2023-07-11
Payer: COMMERCIAL

## 2023-07-11 ENCOUNTER — OFFICE VISIT (OUTPATIENT)
Dept: PRIMARY CARE CLINIC | Facility: CLINIC | Age: 63
End: 2023-07-11
Payer: COMMERCIAL

## 2023-07-11 VITALS
RESPIRATION RATE: 18 BRPM | BODY MASS INDEX: 25.05 KG/M2 | WEIGHT: 175 LBS | DIASTOLIC BLOOD PRESSURE: 60 MMHG | SYSTOLIC BLOOD PRESSURE: 120 MMHG | HEART RATE: 70 BPM | OXYGEN SATURATION: 97 % | HEIGHT: 70 IN | TEMPERATURE: 99 F

## 2023-07-11 DIAGNOSIS — E78.2 MIXED HYPERLIPIDEMIA: ICD-10-CM

## 2023-07-11 DIAGNOSIS — E03.9 PRIMARY HYPOTHYROIDISM: ICD-10-CM

## 2023-07-11 DIAGNOSIS — N40.0 BENIGN PROSTATIC HYPERPLASIA, UNSPECIFIED WHETHER LOWER URINARY TRACT SYMPTOMS PRESENT: Primary | ICD-10-CM

## 2023-07-11 PROCEDURE — 3008F PR BODY MASS INDEX (BMI) DOCUMENTED: ICD-10-PCS | Mod: S$GLB,,, | Performed by: INTERNAL MEDICINE

## 2023-07-11 PROCEDURE — 3044F PR MOST RECENT HEMOGLOBIN A1C LEVEL <7.0%: ICD-10-PCS | Mod: S$GLB,,, | Performed by: INTERNAL MEDICINE

## 2023-07-11 PROCEDURE — 1160F PR REVIEW ALL MEDS BY PRESCRIBER/CLIN PHARMACIST DOCUMENTED: ICD-10-PCS | Mod: S$GLB,,, | Performed by: INTERNAL MEDICINE

## 2023-07-11 PROCEDURE — 99213 OFFICE O/P EST LOW 20 MIN: CPT | Mod: S$GLB,,, | Performed by: INTERNAL MEDICINE

## 2023-07-11 PROCEDURE — 99213 PR OFFICE/OUTPT VISIT, EST, LEVL III, 20-29 MIN: ICD-10-PCS | Mod: S$GLB,,, | Performed by: INTERNAL MEDICINE

## 2023-07-11 PROCEDURE — 3078F PR MOST RECENT DIASTOLIC BLOOD PRESSURE < 80 MM HG: ICD-10-PCS | Mod: S$GLB,,, | Performed by: INTERNAL MEDICINE

## 2023-07-11 PROCEDURE — 1159F PR MEDICATION LIST DOCUMENTED IN MEDICAL RECORD: ICD-10-PCS | Mod: S$GLB,,, | Performed by: INTERNAL MEDICINE

## 2023-07-11 PROCEDURE — 3008F BODY MASS INDEX DOCD: CPT | Mod: S$GLB,,, | Performed by: INTERNAL MEDICINE

## 2023-07-11 PROCEDURE — 3044F HG A1C LEVEL LT 7.0%: CPT | Mod: S$GLB,,, | Performed by: INTERNAL MEDICINE

## 2023-07-11 PROCEDURE — 3078F DIAST BP <80 MM HG: CPT | Mod: S$GLB,,, | Performed by: INTERNAL MEDICINE

## 2023-07-11 PROCEDURE — 3074F SYST BP LT 130 MM HG: CPT | Mod: S$GLB,,, | Performed by: INTERNAL MEDICINE

## 2023-07-11 PROCEDURE — 1160F RVW MEDS BY RX/DR IN RCRD: CPT | Mod: S$GLB,,, | Performed by: INTERNAL MEDICINE

## 2023-07-11 PROCEDURE — 1159F MED LIST DOCD IN RCRD: CPT | Mod: S$GLB,,, | Performed by: INTERNAL MEDICINE

## 2023-07-11 PROCEDURE — 3074F PR MOST RECENT SYSTOLIC BLOOD PRESSURE < 130 MM HG: ICD-10-PCS | Mod: S$GLB,,, | Performed by: INTERNAL MEDICINE

## 2023-07-11 RX ORDER — ZOLPIDEM TARTRATE 10 MG/1
10 TABLET ORAL NIGHTLY PRN
Qty: 30 TABLET | Refills: 2 | Status: SHIPPED | OUTPATIENT
Start: 2023-07-11 | End: 2024-01-16 | Stop reason: SDUPTHER

## 2023-07-11 RX ORDER — DICLOFENAC SODIUM 10 MG/G
1 GEL TOPICAL 4 TIMES DAILY PRN
Qty: 100 G | Refills: 2 | Status: SHIPPED | OUTPATIENT
Start: 2023-07-11 | End: 2023-10-09

## 2023-07-11 RX ORDER — GABAPENTIN 300 MG/1
300 CAPSULE ORAL 2 TIMES DAILY
Qty: 180 CAPSULE | Refills: 3 | Status: SHIPPED | OUTPATIENT
Start: 2023-07-11 | End: 2024-01-16 | Stop reason: SDUPTHER

## 2023-07-11 RX ORDER — MINOXIDIL 2.5 MG/1
TABLET ORAL
COMMUNITY
Start: 2023-06-29 | End: 2024-01-16 | Stop reason: SDUPTHER

## 2023-07-11 RX ORDER — TIZANIDINE 4 MG/1
8 TABLET ORAL NIGHTLY
COMMUNITY
Start: 2023-06-27 | End: 2024-01-16 | Stop reason: SDUPTHER

## 2023-07-11 RX ORDER — LIDOCAINE 50 MG/G
OINTMENT TOPICAL
Qty: 50 G | Refills: 2 | Status: SHIPPED | OUTPATIENT
Start: 2023-07-12 | End: 2023-10-10

## 2023-07-11 RX ORDER — LEVOTHYROXINE SODIUM 50 UG/1
50 TABLET ORAL
Qty: 90 TABLET | Refills: 3 | Status: SHIPPED | OUTPATIENT
Start: 2023-07-11 | End: 2024-01-16 | Stop reason: SDUPTHER

## 2023-07-11 RX ORDER — SIMVASTATIN 20 MG/1
20 TABLET, FILM COATED ORAL NIGHTLY
Qty: 90 TABLET | Refills: 3 | Status: SHIPPED | OUTPATIENT
Start: 2023-07-11 | End: 2024-01-16 | Stop reason: SDUPTHER

## 2023-07-11 NOTE — TELEPHONE ENCOUNTER
----- Message from Opal Cancino sent at 7/11/2023  2:36 PM CDT -----  Contact: pt  Type: Needs Medical Advice  Who Called:  pt     Pharmacy name and phone #:    [facility]/pharm[facility] #[id number] - [place], [place] - [address]  [address]  [facility] [address]  Phone: [phone] Fax: [phone]      Best Call Back Number: [phone]    Additional Information: pt states pharmacy did not receive zolpidem (AMBIEN) 10 mg Tab it is urgent. Please advise

## 2023-07-11 NOTE — TELEPHONE ENCOUNTER
Received notification rx did not go through transmission failed. Please resend the Ambien. Thanks.

## 2023-07-11 NOTE — PROGRESS NOTES
Subjective:       Patient ID: Mitchell Hernandez is a 62 y.o. male.    Chief Complaint: Follow-up and Medication Refill      Patient is established already .......... presents today for a f/u visit , to discuss getting updated labs and for management of the chronic conditions especially joint pains, thyroid dis        Follow-up  This is a chronic problem. The current episode started more than 1 year ago. The problem occurs constantly. The problem has been unchanged. Associated symptoms include myalgias. Pertinent negatives include no fever. The symptoms are aggravated by twisting, walking, bending, exertion and standing. Treatments tried: See MAR.   Medication Refill  This is a chronic problem. The current episode started more than 1 year ago. The problem occurs constantly. The problem has been unchanged. Associated symptoms include myalgias. Pertinent negatives include no fever.   Review of Systems   Constitutional:  Negative for activity change, appetite change and fever.   Respiratory: Negative.     Cardiovascular: Negative.    Gastrointestinal: Negative.    Musculoskeletal:  Positive for myalgias.       Objective:      Physical Exam  Vitals and nursing note reviewed.   Constitutional:       Appearance: Normal appearance. He is obese.   Cardiovascular:      Rate and Rhythm: Normal rate and regular rhythm.      Pulses: Normal pulses.      Heart sounds: Normal heart sounds. No murmur heard.    No friction rub. No gallop.   Pulmonary:      Effort: Pulmonary effort is normal.      Breath sounds: Normal breath sounds. No wheezing.   Skin:     General: Skin is warm and dry.   Neurological:      Mental Status: He is alert.   Psychiatric:         Mood and Affect: Mood normal.       Assessment:       1. Benign prostatic hyperplasia, unspecified whether lower urinary tract symptoms present  -     Prostate Specific Antigen, Diagnostic; Future; Expected date: 07/11/2023    2. Primary hypothyroidism  Overview:  Thyroid: Patient  presents for evaluation of hypothyroidism. Current symptoms include fatigue. Patient denies denies fatigue, weight changes, heat/cold intolerance, bowel/skin changes or CVS symptoms.         Assessment & Plan:  Recheck TSH      3. Mixed hyperlipidemia  Overview:  Dyslipidemia: Patient presents for evaluation of lipids.  Compliance with treatment thus far has been good.  A repeat fasting lipid profile was done.  The patient does use medications that may worsen dyslipidemias (corticosteroids, progestins, anabolic steroids, diuretics, beta-blockers, amiodarone, cyclosporine, olanzapine). The patient exercises every other day.    The patient is not known to have coexisting coronary artery disease.     Cardiac Risk Factors  Age > 45-male, > 55-female:  YES  +1   Smoking:   No   Sig. family hx of CHD*:  NO   Hypertension:   NO   Diabetes:   NO   HDL < 35:   NO   HDL > 59:   NO   Total: 1   *- Sig. family h/o CHD per NCEP = MI or sudden death at <54yo in   father or other 1st-degree male relative, or <64yo in mother or   other 1st-degree female relative        Assessment & Plan:  Recheck lipids  Refill zocor      Other orders  -     diclofenac sodium (VOLTAREN) 1 % Gel; Apply 1 g topically 4 (four) times daily as needed.  Dispense: 100 g; Refill: 2  -     gabapentin (NEURONTIN) 300 MG capsule; Take 1 capsule (300 mg total) by mouth 2 (two) times daily.  Dispense: 180 capsule; Refill: 3  -     levothyroxine (SYNTHROID) 50 MCG tablet; Take 1 tablet (50 mcg total) by mouth before breakfast.  Dispense: 90 tablet; Refill: 3  -     LIDOcaine (XYLOCAINE) 5 % Oint ointment; Apply topically every Mon, Wed, Fri.  Dispense: 50 g; Refill: 2  -     simvastatin (ZOCOR) 20 MG tablet; Take 1 tablet (20 mg total) by mouth every evening.  Dispense: 90 tablet; Refill: 3             Plan:       1. Benign prostatic hyperplasia, unspecified whether lower urinary tract symptoms present  -     Prostate Specific Antigen, Diagnostic; Future;  Expected date: 07/11/2023    2. Primary hypothyroidism  Overview:  Thyroid: Patient presents for evaluation of hypothyroidism. Current symptoms include fatigue. Patient denies denies fatigue, weight changes, heat/cold intolerance, bowel/skin changes or CVS symptoms.         Assessment & Plan:  Recheck TSH      3. Mixed hyperlipidemia  Overview:  Dyslipidemia: Patient presents for evaluation of lipids.  Compliance with treatment thus far has been good.  A repeat fasting lipid profile was done.  The patient does use medications that may worsen dyslipidemias (corticosteroids, progestins, anabolic steroids, diuretics, beta-blockers, amiodarone, cyclosporine, olanzapine). The patient exercises every other day.    The patient is not known to have coexisting coronary artery disease.     Cardiac Risk Factors  Age > 45-male, > 55-female:  YES  +1   Smoking:   No   Sig. family hx of CHD*:  NO   Hypertension:   NO   Diabetes:   NO   HDL < 35:   NO   HDL > 59:   NO   Total: 1   *- Sig. family h/o CHD per NCEP = MI or sudden death at <54yo in   father or other 1st-degree male relative, or <64yo in mother or   other 1st-degree female relative        Assessment & Plan:  Recheck lipids  Refill zocor      Other orders  -     diclofenac sodium (VOLTAREN) 1 % Gel; Apply 1 g topically 4 (four) times daily as needed.  Dispense: 100 g; Refill: 2  -     gabapentin (NEURONTIN) 300 MG capsule; Take 1 capsule (300 mg total) by mouth 2 (two) times daily.  Dispense: 180 capsule; Refill: 3  -     levothyroxine (SYNTHROID) 50 MCG tablet; Take 1 tablet (50 mcg total) by mouth before breakfast.  Dispense: 90 tablet; Refill: 3  -     LIDOcaine (XYLOCAINE) 5 % Oint ointment; Apply topically every Mon, Wed, Fri.  Dispense: 50 g; Refill: 2  -     simvastatin (ZOCOR) 20 MG tablet; Take 1 tablet (20 mg total) by mouth every evening.  Dispense: 90 tablet; Refill: 3

## 2024-01-12 ENCOUNTER — PATIENT MESSAGE (OUTPATIENT)
Dept: FAMILY MEDICINE | Facility: CLINIC | Age: 64
End: 2024-01-12
Payer: COMMERCIAL

## 2024-01-16 ENCOUNTER — OFFICE VISIT (OUTPATIENT)
Dept: FAMILY MEDICINE | Facility: CLINIC | Age: 64
End: 2024-01-16
Payer: COMMERCIAL

## 2024-01-16 VITALS
WEIGHT: 193 LBS | OXYGEN SATURATION: 98 % | HEART RATE: 61 BPM | BODY MASS INDEX: 27.63 KG/M2 | SYSTOLIC BLOOD PRESSURE: 130 MMHG | DIASTOLIC BLOOD PRESSURE: 64 MMHG | HEIGHT: 70 IN

## 2024-01-16 DIAGNOSIS — F51.01 PRIMARY INSOMNIA: ICD-10-CM

## 2024-01-16 DIAGNOSIS — E78.2 MIXED HYPERLIPIDEMIA: ICD-10-CM

## 2024-01-16 DIAGNOSIS — Z00.01 ENCOUNTER FOR GENERAL ADULT MEDICAL EXAMINATION WITH ABNORMAL FINDINGS: Primary | ICD-10-CM

## 2024-01-16 DIAGNOSIS — Z12.5 SPECIAL SCREENING, PROSTATE CANCER: ICD-10-CM

## 2024-01-16 DIAGNOSIS — L65.9 HAIR LOSS: ICD-10-CM

## 2024-01-16 DIAGNOSIS — R73.9 ELEVATED BLOOD SUGAR: ICD-10-CM

## 2024-01-16 DIAGNOSIS — E03.9 PRIMARY HYPOTHYROIDISM: ICD-10-CM

## 2024-01-16 DIAGNOSIS — Z13.1 DIABETES MELLITUS SCREENING: ICD-10-CM

## 2024-01-16 DIAGNOSIS — M15.9 PRIMARY OSTEOARTHRITIS INVOLVING MULTIPLE JOINTS: ICD-10-CM

## 2024-01-16 PROBLEM — G47.00 INSOMNIA, UNSPECIFIED: Status: ACTIVE | Noted: 2024-01-16

## 2024-01-16 PROBLEM — M15.0 PRIMARY OSTEOARTHRITIS INVOLVING MULTIPLE JOINTS: Status: ACTIVE | Noted: 2024-01-16

## 2024-01-16 PROCEDURE — 1159F MED LIST DOCD IN RCRD: CPT | Mod: S$GLB,,, | Performed by: INTERNAL MEDICINE

## 2024-01-16 PROCEDURE — 1160F RVW MEDS BY RX/DR IN RCRD: CPT | Mod: S$GLB,,, | Performed by: INTERNAL MEDICINE

## 2024-01-16 PROCEDURE — 3008F BODY MASS INDEX DOCD: CPT | Mod: S$GLB,,, | Performed by: INTERNAL MEDICINE

## 2024-01-16 PROCEDURE — 3078F DIAST BP <80 MM HG: CPT | Mod: S$GLB,,, | Performed by: INTERNAL MEDICINE

## 2024-01-16 PROCEDURE — 3075F SYST BP GE 130 - 139MM HG: CPT | Mod: S$GLB,,, | Performed by: INTERNAL MEDICINE

## 2024-01-16 PROCEDURE — 99396 PREV VISIT EST AGE 40-64: CPT | Mod: S$GLB,,, | Performed by: INTERNAL MEDICINE

## 2024-01-16 RX ORDER — SIMVASTATIN 20 MG/1
20 TABLET, FILM COATED ORAL NIGHTLY
Qty: 90 TABLET | Refills: 3 | Status: SHIPPED | OUTPATIENT
Start: 2024-01-16 | End: 2025-01-15

## 2024-01-16 RX ORDER — LEVOTHYROXINE SODIUM 50 UG/1
50 TABLET ORAL
Qty: 90 TABLET | Refills: 3 | Status: SHIPPED | OUTPATIENT
Start: 2024-01-16 | End: 2024-01-23

## 2024-01-16 RX ORDER — LIDOCAINE 30 MG/G
1 CREAM TOPICAL 2 TIMES DAILY
Qty: 85 G | Refills: 2 | Status: SHIPPED | OUTPATIENT
Start: 2024-01-16 | End: 2024-04-15

## 2024-01-16 RX ORDER — CELECOXIB 200 MG/1
200 CAPSULE ORAL
Qty: 90 CAPSULE | Refills: 3 | Status: SHIPPED | OUTPATIENT
Start: 2024-01-16 | End: 2025-01-15

## 2024-01-16 RX ORDER — FINASTERIDE 1 MG/1
1 TABLET, FILM COATED ORAL DAILY
Qty: 30 TABLET | Refills: 2 | Status: SHIPPED | OUTPATIENT
Start: 2024-01-16 | End: 2024-04-12

## 2024-01-16 RX ORDER — MINOXIDIL 2.5 MG/1
2.5 TABLET ORAL DAILY
Qty: 90 TABLET | Refills: 3 | Status: SHIPPED | OUTPATIENT
Start: 2024-01-16 | End: 2025-01-15

## 2024-01-16 RX ORDER — GABAPENTIN 300 MG/1
300 CAPSULE ORAL 2 TIMES DAILY
Qty: 180 CAPSULE | Refills: 3 | Status: SHIPPED | OUTPATIENT
Start: 2024-01-16 | End: 2025-01-15

## 2024-01-16 RX ORDER — TIZANIDINE 4 MG/1
8 TABLET ORAL NIGHTLY
Qty: 60 TABLET | Refills: 11 | Status: SHIPPED | OUTPATIENT
Start: 2024-01-16 | End: 2025-01-15

## 2024-01-16 RX ORDER — ZOLPIDEM TARTRATE 10 MG/1
10 TABLET ORAL NIGHTLY PRN
Qty: 30 TABLET | Refills: 2 | Status: SHIPPED | OUTPATIENT
Start: 2024-01-16 | End: 2024-04-04 | Stop reason: SDUPTHER

## 2024-01-16 NOTE — PROGRESS NOTES
Subjective:       Patient ID: Mitchell Hernandez is a 63 y.o. male.    Chief Complaint: Medication Refill (Patient is here for a medication refill. ) and Annual Exam      Patient presents for a wellness visit  No new c/o today  Please refer to initial intake notes for screening/preventive measures  All histories and immunization schedule reviewed with patient    Plus f/u on djd , hair loss, refills     Medication Refill  This is a chronic problem. The current episode started more than 1 year ago. The problem occurs intermittently. The problem has been gradually improving. Associated symptoms include arthralgias. Pertinent negatives include no fever. The symptoms are aggravated by bending, twisting, walking, standing, stress and exertion. He has tried NSAIDs for the symptoms. The treatment provided significant relief.     Review of Systems   Constitutional:  Negative for activity change, appetite change and fever.   Respiratory: Negative.     Cardiovascular: Negative.    Gastrointestinal: Negative.    Musculoskeletal:  Positive for arthralgias.         Objective:      Physical Exam  Vitals and nursing note reviewed.   Constitutional:       Appearance: Normal appearance.   Cardiovascular:      Rate and Rhythm: Normal rate and regular rhythm.      Pulses: Normal pulses.      Heart sounds: Normal heart sounds. No murmur heard.     No friction rub. No gallop.   Pulmonary:      Effort: Pulmonary effort is normal.      Breath sounds: Normal breath sounds. No wheezing.   Skin:     General: Skin is warm and dry.   Neurological:      Mental Status: He is alert.   Psychiatric:         Mood and Affect: Mood normal.         Assessment:       1. Encounter for general adult medical examination with abnormal findings  Overview:  Patient presents for a wellness visit  No new c/o today  Please refer to initial intake notes for screening/preventive measures  All histories and immunization schedule reviewed with patient        Assessment  & Plan:   I gave the patient written recommendations re the outstanding vaccinations basilia RSV, COVID  Diet , wt loss , exercise d/w patient        2. Mixed hyperlipidemia  Overview:  Dyslipidemia: Patient presents for evaluation of lipids.  Compliance with treatment thus far has been good.  A repeat fasting lipid profile was done.  The patient does use medications that may worsen dyslipidemias (corticosteroids, progestins, anabolic steroids, diuretics, beta-blockers, amiodarone, cyclosporine, olanzapine). The patient exercises every other day.    The patient is not known to have coexisting coronary artery disease.     Cardiac Risk Factors  Age > 45-male, > 55-female:  YES  +1   Smoking:   No   Sig. family hx of CHD*:  NO   Hypertension:   NO   Diabetes:   NO   HDL < 35:   NO   HDL > 59:   NO   Total: 1   *- Sig. family h/o CHD per NCEP = MI or sudden death at <56yo in   father or other 1st-degree male relative, or <64yo in mother or   other 1st-degree female relative        Assessment & Plan:  Stable  Recheck liids  Refill zocor      3. Hair loss  Overview:  Improving    Assessment & Plan:  Refill propecia  Check PSA  Refill minoxidil at 2.5mg daily      4. Primary insomnia  Overview:  Stable    Assessment & Plan:  Refill ambien      5. Primary hypothyroidism  Overview:  Thyroid: Patient presents for evaluation of hypothyroidism. Current symptoms include fatigue. Patient denies denies fatigue, weight changes, heat/cold intolerance, bowel/skin changes or CVS symptoms.         Assessment & Plan:  Recheck TSH  Refill synthroid      6. Primary osteoarthritis involving multiple joints  Overview:  Mild early DJD , aches of multiple joints, back,.........    Assessment & Plan:  Refill celebrex, lidocaine cream & zanaflex             Plan:       1. Encounter for general adult medical examination with abnormal findings  Overview:  Patient presents for a wellness visit  No new c/o today  Please refer to initial intake notes  for screening/preventive measures  All histories and immunization schedule reviewed with patient        Assessment & Plan:   I gave the patient written recommendations re the outstanding vaccinations basilia RSV, COVID  Diet , wt loss , exercise d/w patient        2. Mixed hyperlipidemia  Overview:  Dyslipidemia: Patient presents for evaluation of lipids.  Compliance with treatment thus far has been good.  A repeat fasting lipid profile was done.  The patient does use medications that may worsen dyslipidemias (corticosteroids, progestins, anabolic steroids, diuretics, beta-blockers, amiodarone, cyclosporine, olanzapine). The patient exercises every other day.    The patient is not known to have coexisting coronary artery disease.     Cardiac Risk Factors  Age > 45-male, > 55-female:  YES  +1   Smoking:   No   Sig. family hx of CHD*:  NO   Hypertension:   NO   Diabetes:   NO   HDL < 35:   NO   HDL > 59:   NO   Total: 1   *- Sig. family h/o CHD per NCEP = MI or sudden death at <56yo in   father or other 1st-degree male relative, or <66yo in mother or   other 1st-degree female relative        Assessment & Plan:  Stable  Recheck liids  Refill zocor      3. Hair loss  Overview:  Improving    Assessment & Plan:  Refill propecia  Check PSA  Refill minoxidil at 2.5mg daily      4. Primary insomnia  Overview:  Stable    Assessment & Plan:  Refill ambien      5. Primary hypothyroidism  Overview:  Thyroid: Patient presents for evaluation of hypothyroidism. Current symptoms include fatigue. Patient denies denies fatigue, weight changes, heat/cold intolerance, bowel/skin changes or CVS symptoms.         Assessment & Plan:  Recheck TSH  Refill synthroid      6. Primary osteoarthritis involving multiple joints  Overview:  Mild early DJD , aches of multiple joints, back,.........    Assessment & Plan:  Refill celebrex, lidocaine cream & zanaflex

## 2024-01-16 NOTE — ASSESSMENT & PLAN NOTE
I gave the patient written recommendations re the outstanding vaccinations basilia RSV, COVID  Diet , wt loss , exercise d/w patient

## 2024-01-23 ENCOUNTER — PATIENT MESSAGE (OUTPATIENT)
Dept: FAMILY MEDICINE | Facility: CLINIC | Age: 64
End: 2024-01-23
Payer: COMMERCIAL

## 2024-01-23 ENCOUNTER — LAB VISIT (OUTPATIENT)
Dept: LAB | Facility: CLINIC | Age: 64
End: 2024-01-23
Payer: COMMERCIAL

## 2024-01-23 DIAGNOSIS — Z13.1 DIABETES MELLITUS SCREENING: ICD-10-CM

## 2024-01-23 DIAGNOSIS — R73.9 ELEVATED BLOOD SUGAR: ICD-10-CM

## 2024-01-23 DIAGNOSIS — Z12.5 SPECIAL SCREENING, PROSTATE CANCER: ICD-10-CM

## 2024-01-23 DIAGNOSIS — E03.9 PRIMARY HYPOTHYROIDISM: Primary | ICD-10-CM

## 2024-01-23 DIAGNOSIS — E03.9 PRIMARY HYPOTHYROIDISM: ICD-10-CM

## 2024-01-23 DIAGNOSIS — E78.2 MIXED HYPERLIPIDEMIA: ICD-10-CM

## 2024-01-23 LAB
CHOLEST SERPL-MCNC: 167 MG/DL (ref 120–199)
CHOLEST/HDLC SERPL: 2.1 {RATIO} (ref 2–5)
COMPLEXED PSA SERPL-MCNC: 1.1 NG/ML (ref 0–4)
ESTIMATED AVG GLUCOSE: 100 MG/DL (ref 68–131)
GLUCOSE SERPL-MCNC: 128 MG/DL (ref 70–110)
HBA1C MFR BLD: 5.1 % (ref 4–5.6)
HDLC SERPL-MCNC: 80 MG/DL (ref 40–75)
HDLC SERPL: 47.9 % (ref 20–50)
LDLC SERPL CALC-MCNC: 70.6 MG/DL (ref 63–159)
NONHDLC SERPL-MCNC: 87 MG/DL
T4 FREE SERPL-MCNC: 0.57 NG/DL (ref 0.71–1.51)
TRIGL SERPL-MCNC: 82 MG/DL (ref 30–150)
TSH SERPL DL<=0.005 MIU/L-ACNC: 17.22 UIU/ML (ref 0.4–4)

## 2024-01-23 PROCEDURE — 80061 LIPID PANEL: CPT | Performed by: INTERNAL MEDICINE

## 2024-01-23 PROCEDURE — 36415 COLL VENOUS BLD VENIPUNCTURE: CPT | Mod: ,,, | Performed by: INTERNAL MEDICINE

## 2024-01-23 PROCEDURE — 84443 ASSAY THYROID STIM HORMONE: CPT | Performed by: INTERNAL MEDICINE

## 2024-01-23 PROCEDURE — 84153 ASSAY OF PSA TOTAL: CPT | Performed by: INTERNAL MEDICINE

## 2024-01-23 PROCEDURE — 83036 HEMOGLOBIN GLYCOSYLATED A1C: CPT | Performed by: INTERNAL MEDICINE

## 2024-01-23 PROCEDURE — 82947 ASSAY GLUCOSE BLOOD QUANT: CPT | Performed by: INTERNAL MEDICINE

## 2024-01-23 PROCEDURE — 84439 ASSAY OF FREE THYROXINE: CPT | Performed by: INTERNAL MEDICINE

## 2024-01-23 RX ORDER — LEVOTHYROXINE SODIUM 75 UG/1
75 TABLET ORAL
Qty: 90 TABLET | Refills: 3 | Status: SHIPPED | OUTPATIENT
Start: 2024-01-23 | End: 2024-04-04

## 2024-01-23 NOTE — TELEPHONE ENCOUNTER
I called the patient and spoke to him he side his taking all the medication. I let him know if he's taking levothyroxine and dose might need to be increased.

## 2024-01-24 ENCOUNTER — TELEPHONE (OUTPATIENT)
Dept: FAMILY MEDICINE | Facility: CLINIC | Age: 64
End: 2024-01-24
Payer: COMMERCIAL

## 2024-01-24 NOTE — TELEPHONE ENCOUNTER
----- Message from Toni Leigh MD sent at 1/23/2024  4:35 PM CST -----  Regarding: Labs  OK pls sched him for new labs in March Ty

## 2024-02-15 ENCOUNTER — TELEPHONE (OUTPATIENT)
Dept: FAMILY MEDICINE | Facility: CLINIC | Age: 64
End: 2024-02-15
Payer: COMMERCIAL

## 2024-02-15 NOTE — TELEPHONE ENCOUNTER
----- Message from Jill Bailey sent at 2/15/2024  9:01 AM CST -----  Type:  Patient Returning Call    Who Called:pt     Who Left Message for Patient:Gaby Carvalho    Does the patient know what this is regarding?:no     Would the patient rather a call back or a response via [facility]? Callback     Best Call Back Number:[phone] (home)       Additional Information: please advise thank you

## 2024-02-15 NOTE — TELEPHONE ENCOUNTER
Call placed to pt due to Dr. Garland's orders to contact pt concerning lab results, currently not available msg left for return call.     ----- Message from Toni Leigh MD sent at 1/23/2024  3:28 PM CST -----  Regarding: Thyroid pill  Hi : pls ask him if he's taking levothyroxine ? His TSH is way out of line and dose might need to be increased  Thx  ----- Message -----  From: [facility]  Sent: 1/23/2024   1:52 PM CST  To: Toni Leigh MD

## 2024-03-28 ENCOUNTER — LAB VISIT (OUTPATIENT)
Dept: LAB | Facility: CLINIC | Age: 64
End: 2024-03-28
Payer: COMMERCIAL

## 2024-03-28 DIAGNOSIS — E03.9 PRIMARY HYPOTHYROIDISM: ICD-10-CM

## 2024-03-28 LAB
T4 FREE SERPL-MCNC: 0.63 NG/DL (ref 0.71–1.51)
TSH SERPL DL<=0.005 MIU/L-ACNC: 22.48 UIU/ML (ref 0.4–4)

## 2024-03-28 PROCEDURE — 36415 COLL VENOUS BLD VENIPUNCTURE: CPT | Mod: ,,, | Performed by: INTERNAL MEDICINE

## 2024-03-28 PROCEDURE — 84439 ASSAY OF FREE THYROXINE: CPT | Performed by: INTERNAL MEDICINE

## 2024-03-28 PROCEDURE — 84443 ASSAY THYROID STIM HORMONE: CPT | Performed by: INTERNAL MEDICINE

## 2024-04-03 ENCOUNTER — PATIENT MESSAGE (OUTPATIENT)
Dept: FAMILY MEDICINE | Facility: CLINIC | Age: 64
End: 2024-04-03
Payer: COMMERCIAL

## 2024-04-04 ENCOUNTER — OFFICE VISIT (OUTPATIENT)
Dept: FAMILY MEDICINE | Facility: CLINIC | Age: 64
End: 2024-04-04
Payer: COMMERCIAL

## 2024-04-04 VITALS
HEIGHT: 70 IN | SYSTOLIC BLOOD PRESSURE: 122 MMHG | HEART RATE: 76 BPM | OXYGEN SATURATION: 95 % | WEIGHT: 183.13 LBS | TEMPERATURE: 98 F | DIASTOLIC BLOOD PRESSURE: 70 MMHG | BODY MASS INDEX: 26.22 KG/M2

## 2024-04-04 DIAGNOSIS — F51.01 PRIMARY INSOMNIA: ICD-10-CM

## 2024-04-04 DIAGNOSIS — E03.9 PRIMARY HYPOTHYROIDISM: Primary | ICD-10-CM

## 2024-04-04 PROCEDURE — 3074F SYST BP LT 130 MM HG: CPT | Mod: S$GLB,,, | Performed by: INTERNAL MEDICINE

## 2024-04-04 PROCEDURE — 1160F RVW MEDS BY RX/DR IN RCRD: CPT | Mod: S$GLB,,, | Performed by: INTERNAL MEDICINE

## 2024-04-04 PROCEDURE — 99213 OFFICE O/P EST LOW 20 MIN: CPT | Mod: S$GLB,,, | Performed by: INTERNAL MEDICINE

## 2024-04-04 PROCEDURE — 3078F DIAST BP <80 MM HG: CPT | Mod: S$GLB,,, | Performed by: INTERNAL MEDICINE

## 2024-04-04 PROCEDURE — 1159F MED LIST DOCD IN RCRD: CPT | Mod: S$GLB,,, | Performed by: INTERNAL MEDICINE

## 2024-04-04 PROCEDURE — 3008F BODY MASS INDEX DOCD: CPT | Mod: S$GLB,,, | Performed by: INTERNAL MEDICINE

## 2024-04-04 PROCEDURE — 3044F HG A1C LEVEL LT 7.0%: CPT | Mod: S$GLB,,, | Performed by: INTERNAL MEDICINE

## 2024-04-04 RX ORDER — ZOLPIDEM TARTRATE 10 MG/1
10 TABLET ORAL NIGHTLY PRN
Qty: 30 TABLET | Refills: 2 | Status: SHIPPED | OUTPATIENT
Start: 2024-04-04 | End: 2024-07-03

## 2024-04-04 RX ORDER — LEVOTHYROXINE SODIUM 125 UG/1
125 TABLET ORAL
Qty: 90 TABLET | Refills: 0 | Status: SHIPPED | OUTPATIENT
Start: 2024-04-04 | End: 2024-07-03

## 2024-04-04 NOTE — PROGRESS NOTES
Subjective:       Patient ID: Mitchell Hernandez is a 63 y.o. male.    Chief Complaint: Results (Lab result )      Patient is established already .......... presents today for a f/u visit , to discuss labs results and for management of the chronic conditions basilia thyroid dis, refills        Thyroid Problem  Presents for follow-up visit. Symptoms include fatigue, hair loss and weight gain. Patient reports no cold intolerance, constipation, depressed mood, dry skin, hoarse voice, leg swelling, nail problem or weight loss. The symptoms have been stable.     Review of Systems   Constitutional:  Positive for fatigue and weight gain. Negative for activity change, appetite change, fever and weight loss.   HENT:  Negative for hoarse voice.    Respiratory: Negative.     Cardiovascular: Negative.    Gastrointestinal: Negative.  Negative for constipation.   Endocrine: Positive for hair loss. Negative for cold intolerance.   Musculoskeletal: Negative.          Objective:      Physical Exam  Vitals and nursing note reviewed.   Constitutional:       Appearance: Normal appearance.   Cardiovascular:      Rate and Rhythm: Normal rate and regular rhythm.      Pulses: Normal pulses.      Heart sounds: Normal heart sounds. No murmur heard.     No friction rub. No gallop.   Pulmonary:      Effort: Pulmonary effort is normal.      Breath sounds: Normal breath sounds. No wheezing.   Skin:     General: Skin is warm and dry.   Neurological:      Mental Status: He is alert.   Psychiatric:         Mood and Affect: Mood normal.         Assessment:       1. Primary hypothyroidism  Overview:  Thyroid: Patient presents for evaluation of hypothyroidism. Current symptoms include fatigue. Patient denies denies fatigue, weight changes, heat/cold intolerance, bowel/skin changes or CVS symptoms.         Assessment & Plan:  Inc dose  to 125mcg  Recheck in 3M    Orders:  -     T4, Free; Future; Expected date: 04/04/2024  -     TSH; Future; Expected date:  04/04/2024    2. Primary insomnia  Overview:  Stable    Assessment & Plan:  Refill ambien      Other orders  -     levothyroxine (SYNTHROID) 125 MCG tablet; Take 1 tablet (125 mcg total) by mouth before breakfast.  Dispense: 90 tablet; Refill: 0  -     zolpidem (AMBIEN) 10 mg Tab; Take 1 tablet (10 mg total) by mouth nightly as needed (sleep).  Dispense: 30 tablet; Refill: 2           Plan:       1. Primary hypothyroidism  Overview:  Thyroid: Patient presents for evaluation of hypothyroidism. Current symptoms include fatigue. Patient denies denies fatigue, weight changes, heat/cold intolerance, bowel/skin changes or CVS symptoms.         Assessment & Plan:  Inc dose  to 125mcg  Recheck in 3M    Orders:  -     T4, Free; Future; Expected date: 04/04/2024  -     TSH; Future; Expected date: 04/04/2024    2. Primary insomnia  Overview:  Stable    Assessment & Plan:  Refill ambien      Other orders  -     levothyroxine (SYNTHROID) 125 MCG tablet; Take 1 tablet (125 mcg total) by mouth before breakfast.  Dispense: 90 tablet; Refill: 0  -     zolpidem (AMBIEN) 10 mg Tab; Take 1 tablet (10 mg total) by mouth nightly as needed (sleep).  Dispense: 30 tablet; Refill: 2

## 2024-04-04 NOTE — PATIENT INSTRUCTIONS
Take 125 mcg daily of synthroid on an empty stomach  Lets repeat labs in 3M  I sent you a refill for ambien  Pls get the shingles & RSV vaccines at the [facility]

## 2024-04-12 DIAGNOSIS — L65.9 HAIR LOSS: ICD-10-CM

## 2024-04-12 RX ORDER — FINASTERIDE 1 MG/1
1 TABLET, FILM COATED ORAL
Qty: 90 TABLET | Refills: 3 | Status: SHIPPED | OUTPATIENT
Start: 2024-04-12

## 2024-04-12 NOTE — TELEPHONE ENCOUNTER
No care due was identified.  [facility] Embedded Care Due Messages. Reference number: [id number].   4/12/2024 12:42:30 AM CDT

## 2024-04-12 NOTE — TELEPHONE ENCOUNTER
Refill Routing Note   Medication(s) are not appropriate for processing by [facility] for the following reason(s):        New or recently adjusted medication    [facility] action(s):  Defer             Appointments  past 12m or future 3m with PCP    Date Provider   Last Visit   4/4/2024 Toni Leigh MD   Next Visit   7/5/2024 Toni Leigh MD   ED visits in past 90 days: 0        Note composed:8:18 AM 04/12/2024

## 2024-04-22 PROBLEM — Z00.01 ENCOUNTER FOR GENERAL ADULT MEDICAL EXAMINATION WITH ABNORMAL FINDINGS: Status: RESOLVED | Noted: 2023-04-04 | Resolved: 2024-04-22

## 2024-05-23 RX ORDER — VALACYCLOVIR HYDROCHLORIDE 500 MG/1
500 TABLET, FILM COATED ORAL DAILY
Qty: 90 TABLET | Refills: 3 | Status: SHIPPED | OUTPATIENT
Start: 2024-05-23 | End: 2025-05-23

## 2024-06-30 RX ORDER — LEVOTHYROXINE SODIUM 125 UG/1
125 TABLET ORAL
Qty: 90 TABLET | Refills: 2 | Status: SHIPPED | OUTPATIENT
Start: 2024-06-30

## 2024-06-30 NOTE — TELEPHONE ENCOUNTER
Care Due:                  Date            Visit Type   Department     Provider  --------------------------------------------------------------------------------                                EP -                              PRIMARY      [facility] FAMILY  Last Visit: 04-      CARE ([facility])   MEDICINE       Toni Leigh  Next Visit: None Scheduled  None         None Found                                                            Last  Test          Frequency    Reason                     Performed    Due Date  --------------------------------------------------------------------------------    CBC.........  12 months..  celecoxib, diclofenac,     Not Found    Overdue                             valACYclovir.............    CMP.........  12 months..  celecoxib, diclofenac,     04- 03-                             simvastatin, valACYclovir    [employer] Embedded Care Due Messages. Reference number: [id number].   6/30/2024 1:04:58 AM CDT

## 2024-06-30 NOTE — TELEPHONE ENCOUNTER
Provider Staff:  Action required for this patient    Requires labs      Please see care gap opportunities below in Care Due Message.    Thanks!  [facility]     Appointments      Date Provider   Last Visit   4/4/2024 Toni Leigh MD   Next Visit   7/8/2024 Toni Leigh MD     Refill Decision Note   Mitchell Hernandez  is requesting a refill authorization.  Brief Assessment and Rationale for Refill:  Approve     Medication Therapy Plan:         Comments:     Note composed:11:05 AM 06/30/2024

## 2024-07-08 ENCOUNTER — LAB VISIT (OUTPATIENT)
Dept: LAB | Facility: CLINIC | Age: 64
End: 2024-07-08
Payer: COMMERCIAL

## 2024-07-08 ENCOUNTER — OFFICE VISIT (OUTPATIENT)
Dept: FAMILY MEDICINE | Facility: CLINIC | Age: 64
End: 2024-07-08
Payer: COMMERCIAL

## 2024-07-08 VITALS
HEART RATE: 60 BPM | BODY MASS INDEX: 25.34 KG/M2 | HEIGHT: 70 IN | RESPIRATION RATE: 20 BRPM | SYSTOLIC BLOOD PRESSURE: 105 MMHG | WEIGHT: 177 LBS | DIASTOLIC BLOOD PRESSURE: 55 MMHG | OXYGEN SATURATION: 98 %

## 2024-07-08 DIAGNOSIS — Z00.00 PREVENTATIVE HEALTH CARE: ICD-10-CM

## 2024-07-08 DIAGNOSIS — E03.9 PRIMARY HYPOTHYROIDISM: Primary | ICD-10-CM

## 2024-07-08 DIAGNOSIS — R73.9 HYPERGLYCEMIA: ICD-10-CM

## 2024-07-08 DIAGNOSIS — R73.9 ELEVATED BLOOD SUGAR: ICD-10-CM

## 2024-07-08 DIAGNOSIS — E03.9 PRIMARY HYPOTHYROIDISM: ICD-10-CM

## 2024-07-08 DIAGNOSIS — F51.01 PRIMARY INSOMNIA: ICD-10-CM

## 2024-07-08 LAB
GLUCOSE SERPL-MCNC: 93 MG/DL (ref 70–110)
T4 FREE SERPL-MCNC: 1.01 NG/DL (ref 0.71–1.51)
TSH SERPL DL<=0.005 MIU/L-ACNC: 2.28 UIU/ML (ref 0.4–4)

## 2024-07-08 PROCEDURE — 3008F BODY MASS INDEX DOCD: CPT | Mod: S$GLB,,, | Performed by: INTERNAL MEDICINE

## 2024-07-08 PROCEDURE — 1160F RVW MEDS BY RX/DR IN RCRD: CPT | Mod: S$GLB,,, | Performed by: INTERNAL MEDICINE

## 2024-07-08 PROCEDURE — 99214 OFFICE O/P EST MOD 30 MIN: CPT | Mod: S$GLB,,, | Performed by: INTERNAL MEDICINE

## 2024-07-08 PROCEDURE — 84439 ASSAY OF FREE THYROXINE: CPT | Performed by: INTERNAL MEDICINE

## 2024-07-08 PROCEDURE — G2211 COMPLEX E/M VISIT ADD ON: HCPCS | Mod: S$GLB,,, | Performed by: INTERNAL MEDICINE

## 2024-07-08 PROCEDURE — 36415 COLL VENOUS BLD VENIPUNCTURE: CPT | Mod: ,,, | Performed by: INTERNAL MEDICINE

## 2024-07-08 PROCEDURE — 3044F HG A1C LEVEL LT 7.0%: CPT | Mod: S$GLB,,, | Performed by: INTERNAL MEDICINE

## 2024-07-08 PROCEDURE — 3078F DIAST BP <80 MM HG: CPT | Mod: S$GLB,,, | Performed by: INTERNAL MEDICINE

## 2024-07-08 PROCEDURE — 82947 ASSAY GLUCOSE BLOOD QUANT: CPT | Performed by: INTERNAL MEDICINE

## 2024-07-08 PROCEDURE — 1159F MED LIST DOCD IN RCRD: CPT | Mod: S$GLB,,, | Performed by: INTERNAL MEDICINE

## 2024-07-08 PROCEDURE — 3074F SYST BP LT 130 MM HG: CPT | Mod: S$GLB,,, | Performed by: INTERNAL MEDICINE

## 2024-07-08 PROCEDURE — 84443 ASSAY THYROID STIM HORMONE: CPT | Performed by: INTERNAL MEDICINE

## 2024-07-08 RX ORDER — ZOLPIDEM TARTRATE 10 MG/1
10 TABLET ORAL NIGHTLY PRN
Qty: 30 TABLET | Refills: 2 | Status: SHIPPED | OUTPATIENT
Start: 2024-07-08 | End: 2024-10-06

## 2024-07-08 NOTE — PROGRESS NOTES
Subjective:       Patient ID: Mitchell Hernandez is a 63 y.o. male.    Chief Complaint: Follow-up (3 mos f/u)      Patient is established already .......... presents today for a f/u visit , to discuss labs results and for management of the chronic conditions.        Thyroid Problem  Presents for follow-up visit. Symptoms include hair loss and weight gain. The symptoms have been improving.     Review of Systems   Constitutional:  Positive for unexpected weight change and weight gain. Negative for activity change, appetite change and fever.   Respiratory: Negative.     Cardiovascular: Negative.    Gastrointestinal: Negative.    Endocrine: Positive for hair loss.   Musculoskeletal: Negative.          Objective:      Physical Exam  Vitals and nursing note reviewed.   Constitutional:       Appearance: Normal appearance.   Cardiovascular:      Rate and Rhythm: Normal rate and regular rhythm.      Pulses: Normal pulses.      Heart sounds: Normal heart sounds. No murmur heard.     No friction rub. No gallop.   Pulmonary:      Effort: Pulmonary effort is normal.      Breath sounds: Normal breath sounds. No wheezing.   Skin:     General: Skin is warm and dry.   Neurological:      Mental Status: He is alert.   Psychiatric:         Mood and Affect: Mood normal.         Assessment:       1. Primary hypothyroidism  Overview:  Thyroid: Patient presents for evaluation of hypothyroidism. Current symptoms include fatigue. Patient denies denies fatigue, weight changes, heat/cold intolerance, bowel/skin changes or CVS symptoms.         Assessment & Plan:  Synthroid inc to 125 mcg recently  Recheck Thyroid function test today    Orders:  -     T4, Free; Future; Expected date: 07/08/2024  -     TSH; Future; Expected date: 07/08/2024    2. Elevated blood sugar  -     Glucose, Fasting; Future; Expected date: 07/08/2024    3. Hyperglycemia  Overview:  With slightly increased blood sugar recently    Assessment & Plan:  Normal A1c  Recheck  fasting sugar today      4. Preventative health care  Overview:  See below    Assessment & Plan:  Get COVID update if available  RSV & shingles obtained and documented      Other orders  -     zolpidem (AMBIEN) 10 mg Tab; Take 1 tablet (10 mg total) by mouth nightly as needed (sleep).  Dispense: 30 tablet; Refill: 2           Plan:       1. Primary hypothyroidism  Overview:  Thyroid: Patient presents for evaluation of hypothyroidism. Current symptoms include fatigue. Patient denies denies fatigue, weight changes, heat/cold intolerance, bowel/skin changes or CVS symptoms.         Assessment & Plan:  Synthroid inc to 125 mcg recently  Recheck Thyroid function test today    Orders:  -     T4, Free; Future; Expected date: 07/08/2024  -     TSH; Future; Expected date: 07/08/2024    2. Elevated blood sugar  -     Glucose, Fasting; Future; Expected date: 07/08/2024    3. Hyperglycemia  Overview:  With slightly increased blood sugar recently    Assessment & Plan:  Normal A1c  Recheck fasting sugar today      4. Preventative health care  Overview:  See below    Assessment & Plan:  Get COVID update if available  RSV & shingles obtained and documented      Other orders  -     zolpidem (AMBIEN) 10 mg Tab; Take 1 tablet (10 mg total) by mouth nightly as needed (sleep).  Dispense: 30 tablet; Refill: 2        Visit today included increased complexity associated with the care of the episodic problem.   I addressed and managing the longitudinal care of the patient due to the serious and/or complex managed problem(s).  .

## 2024-07-10 ENCOUNTER — TELEPHONE (OUTPATIENT)
Dept: FAMILY MEDICINE | Facility: CLINIC | Age: 64
End: 2024-07-10
Payer: COMMERCIAL

## 2024-07-10 NOTE — TELEPHONE ENCOUNTER
----- Message from Toni Leigh MD sent at 7/9/2024  3:44 PM CDT -----  Please tell patient that results are acceptable and can wait till next visit. Thank you.

## 2024-10-01 RX ORDER — ZOLPIDEM TARTRATE 10 MG/1
10 TABLET ORAL NIGHTLY PRN
Qty: 30 TABLET | Refills: 2 | Status: SHIPPED | OUTPATIENT
Start: 2024-10-01 | End: 2024-12-30

## 2024-10-01 NOTE — TELEPHONE ENCOUNTER
Care Due:                  Date            Visit Type   Department     Provider  --------------------------------------------------------------------------------                                EP -                              PRIMARY      [facility]  Last Visit: 07-      CARE ([facility])   GERDA Leigh                              EP -                              PRIMARY      [facility]  Next Visit: 10-      CARE ([facility])   GERDA Leigh                                                            Last  Test          Frequency    Reason                     Performed    Due Date  --------------------------------------------------------------------------------    CBC.........  12 months..  celecoxib, diclofenac,     Not Found    Overdue                             valACYclovir.............    CMP.........  12 months..  celecoxib, diclofenac,     04- 03-                             simvastatin, valACYclovir    [employer] Embedded Care Due Messages. Reference number: [id number].   10/01/2024 7:38:27 AM CDT   Subjective:       Patient ID: Drake Barry is a 48 y.o. male.    Chief Complaint: Back Pain (Lower back pain radiating to left upper leg)    Low back pain began about 3 days ago.  No low back pain now.  He now has sharp pain and numbness in his left anterior thigh.  No history of injury.  Pain is worse when he sits.    Review of Systems      Objective:      Physical Exam  Constitutional:       General: He is not in acute distress.     Appearance: Normal appearance. He is not ill-appearing.   Cardiovascular:      Rate and Rhythm: Normal rate and regular rhythm.   Pulmonary:      Effort: Pulmonary effort is normal.      Breath sounds: Normal breath sounds.   Musculoskeletal:      Lumbar back: No spasms, tenderness or bony tenderness. Normal range of motion. Negative right straight leg raise test and negative left straight leg raise test.      Left hip: Normal. No tenderness. Normal range of motion.      Right lower leg: No edema.      Left lower leg: No edema.   Skin:     General: Skin is warm and dry.      Findings: No lesion or rash.   Neurological:      Mental Status: He is alert.      Motor: No weakness.      Coordination: Coordination normal.      Deep Tendon Reflexes: Reflexes normal.         Assessment:       Problem List Items Addressed This Visit    None     Visit Diagnoses     Acute midline low back pain with left-sided sciatica    -  Primary    Relevant Orders    X-Ray Lumbar Spine AP And Lateral    Other constipation              Plan:         lumbar spine x-ray showed somewhat severe constipation but no abnormality of spine.

## 2024-10-07 PROBLEM — Z00.00 PREVENTATIVE HEALTH CARE: Status: RESOLVED | Noted: 2023-04-04 | Resolved: 2024-10-07

## 2024-12-31 DIAGNOSIS — G47.00 INSOMNIA, UNSPECIFIED TYPE: Primary | ICD-10-CM

## 2024-12-31 RX ORDER — ZOLPIDEM TARTRATE 10 MG/1
10 TABLET ORAL NIGHTLY PRN
Qty: 30 TABLET | Refills: 2 | Status: CANCELLED | OUTPATIENT
Start: 2024-12-31 | End: 2025-03-31

## 2025-01-02 ENCOUNTER — PATIENT MESSAGE (OUTPATIENT)
Dept: FAMILY MEDICINE | Facility: CLINIC | Age: 65
End: 2025-01-02

## 2025-01-02 NOTE — TELEPHONE ENCOUNTER
----- Message from [place] sent at 12/31/2024 10:40 AM CST -----  Regarding: medication refill  Pt came into clinic stating this is last refill of this medication. He needs a new refill count sent to the [facility] [address] in [place].  Patient would like to be contacted when this has been completed. Pt can be contacted at [phone].  
LOV 07/08/24. NOV 01/02/25  
No care due was identified.  [facility] Embedded Care Due Messages. Reference number: [id number].   12/31/2024 10:51:13 AM CST  
Please see the attached refill request. Please send ASAP address updated.   
yes

## 2025-01-03 ENCOUNTER — TELEPHONE (OUTPATIENT)
Dept: FAMILY MEDICINE | Facility: CLINIC | Age: 65
End: 2025-01-03

## 2025-01-03 NOTE — TELEPHONE ENCOUNTER
Be advised, medication was not sent in to pharmacy.  Please send ambien in to [facility] [address]

## 2025-01-03 NOTE — TELEPHONE ENCOUNTER
----- Message from [place] sent at 1/3/2025  9:02 AM CST -----  Contact: pt  Type: Needs Medical Advice         Who Called: pt  Best Call Back Number:[phone]  Additional Information: Requesting a call back regarding pharm said they never received the Rx for zolpidem (AMBIEN) 10 mg Tab that Dr Garland said he called in yesterday. Pt asking for office to please call it in today. Pt asking for a call back once called in.        [id number] - [place], [place] - [address] [place] [address] AT [address] [address]  Phone: [phone] Fax: [phone]      Please Advise- Thank you

## 2025-01-07 ENCOUNTER — OFFICE VISIT (OUTPATIENT)
Dept: FAMILY MEDICINE | Facility: CLINIC | Age: 65
End: 2025-01-07
Payer: MEDICARE

## 2025-01-07 VITALS
HEART RATE: 70 BPM | OXYGEN SATURATION: 99 % | HEIGHT: 70 IN | SYSTOLIC BLOOD PRESSURE: 110 MMHG | WEIGHT: 178 LBS | BODY MASS INDEX: 25.48 KG/M2 | DIASTOLIC BLOOD PRESSURE: 68 MMHG

## 2025-01-07 DIAGNOSIS — Z13.6 ENCOUNTER FOR ABDOMINAL AORTIC ANEURYSM (AAA) SCREENING: ICD-10-CM

## 2025-01-07 DIAGNOSIS — E03.9 PRIMARY HYPOTHYROIDISM: ICD-10-CM

## 2025-01-07 DIAGNOSIS — Z23 NEED FOR PROPHYLACTIC VACCINATION AGAINST STREPTOCOCCUS PNEUMONIAE (PNEUMOCOCCUS) AND INFLUENZA: ICD-10-CM

## 2025-01-07 DIAGNOSIS — Z00.00 WELCOME TO MEDICARE PREVENTIVE VISIT: Primary | ICD-10-CM

## 2025-01-07 DIAGNOSIS — R73.9 ELEVATED BLOOD SUGAR: ICD-10-CM

## 2025-01-07 DIAGNOSIS — I10 ESSENTIAL HYPERTENSION, BENIGN: ICD-10-CM

## 2025-01-07 DIAGNOSIS — N40.0 BENIGN PROSTATIC HYPERPLASIA WITHOUT LOWER URINARY TRACT SYMPTOMS: ICD-10-CM

## 2025-01-07 DIAGNOSIS — E78.2 MIXED HYPERLIPIDEMIA: ICD-10-CM

## 2025-01-07 DIAGNOSIS — F51.01 PRIMARY INSOMNIA: ICD-10-CM

## 2025-01-07 PROCEDURE — 90677 PCV20 VACCINE IM: CPT | Mod: S$GLB,,, | Performed by: INTERNAL MEDICINE

## 2025-01-07 PROCEDURE — 99497 ADVNCD CARE PLAN 30 MIN: CPT | Mod: 33,S$GLB,, | Performed by: INTERNAL MEDICINE

## 2025-01-07 PROCEDURE — 93010 ELECTROCARDIOGRAM REPORT: CPT | Mod: S$GLB,,, | Performed by: INTERNAL MEDICINE

## 2025-01-07 PROCEDURE — G0402 INITIAL PREVENTIVE EXAM: HCPCS | Mod: S$GLB,,, | Performed by: INTERNAL MEDICINE

## 2025-01-07 PROCEDURE — 93005 ELECTROCARDIOGRAM TRACING: CPT | Mod: S$GLB,,, | Performed by: INTERNAL MEDICINE

## 2025-01-07 PROCEDURE — G0009 ADMIN PNEUMOCOCCAL VACCINE: HCPCS | Mod: S$GLB,,, | Performed by: INTERNAL MEDICINE

## 2025-01-07 NOTE — ASSESSMENT & PLAN NOTE
I gave the patient written recommendations re the outstanding vaccinations with the AVS  Diet , wt loss , exercise d/w patient  I gave the patient a copy of the AMD  We discussed tobacco, ETOH & physical activity  Dep screen : neg  Order abd US to screen for AAA  Prevnar 20 today

## 2025-01-07 NOTE — PROGRESS NOTES
Subjective:       Patient ID: Mitchell Hernandez is a 64 y.o. male.    Chief Complaint: Medication Refill (Patient is here for a follow up and medication refills. ) and Annual Exam      History of Present Illness    CHIEF COMPLAINT:  Mitchell presents for an annual wellness visit and to discuss recent medical interventions for hip pain.    HPI:  Mitchell reports recent problems with his right hip. He underwent an MRI and X-ray of the hip, followed by a Kenalog (steroid) injection from Dr. Martinez, a physician located next door to the current practitioner's office. Mitchell reports improvement in his hip pain following the injection.    Mitchell engages in regular physical activity, primarily golfing 3-4 times per week. He estimates walking for approximately 1 hour during each golfing session, including time spent searching for golf balls.    Mitchell denies falls in the past year, current smoking, feeling down or depressed, feeling hopeless, and having problems with indecisiveness or lack of pleasure in the past 2 weeks.    MEDICATIONS:  Mitchell is on Voxithrin or Synthroid 125 mcg for thyroid condition. He is also on Ambientin, Valtrex, Xanaflex, Simvastatin, Gabapentin, Propecia, Volterangell, Celebrex, and Viagra. For sleep, he has been prescribed Zolpidem (Ambien) with a 30-day supply and 2 refills.    MEDICAL HISTORY:  Mitchell has a history of right hip problems. He has a history of smoking, with cessation 40 years ago.    TEST RESULTS:  Mitchell's thyroid function test on July 8th of last year showed a TSH of 2, which was a significant improvement from March when it was 22. On the same date, his blood sugar test was described as optimal.    IMAGING:  Mitchell recently completed both an MRI and X-ray of his right hip.    SOCIAL HISTORY:  Mitchell quit smoking 40 years ago. He consumes alcohol occasionally, about 2-4 times per month.         Review of Systems Review of system:  Patient reports no dry skin, no itching, no  abnormal moles, no jaundice, no rashes, no hives, no discoloration, no excessive facial or body hair between bracket hirsutism, no hair thinning, no growth/lesions, and no excessive sweating; patient reports no fever, no chills, no night sweats, no significant weight gain, no significant weight loss, no exercise intolerance  Patient reports normal appetite and no sleep disturbances (insomnia)  Patient reports no dry eyes, no irritation, no pain in the eyes, no visual changes, no floaters, no sensitivity to light between bracket photophobia, no seeing double between bracket diplopia, and  No discharge.  Patient reports no difficulty hearing, no ear pain, no vertigo and no ringing in the ears between bracket tinnitus.  Patient reports no difficulty smelling, no frequent nosebleeds, and no nose/sinus  Problems.  Patient reports no dry mouth, no unusual taste of foods, no mouth ulcers, no bleeding gums, and oral abnormalities and no teeth problem  Patient reports no sore throat, no difficulty swallowing between bracket dysphagia, no anterior neck pain/tenderness, no snoring no change in voice.  Patient reports no chest pain, no arm pain on exertion, no shortness of breath when walking, no shortness of breath when lying down, no palpitations, no known heart murmur, no lightheadedness, no calf or jaw pains, and no ankle edema.  Patient reports no cough, no nasal congestion, no runny nose, no sinus pressure, no wheezing, no frequent sneezing, no shortness of breath, no rapid breathing, no sputum production and no coughing up blood  Patient reports no nausea, no vomiting, no vomiting blood, no abdominal pain, normal appetite, no diarrhea, no constipation, no dyspepsia/reflux, no heartburn, no early satiety, complete emptying of stool cup, no bloating, able to restrain bowel movements, no bowel urgency and no blood in stools/on toilet paper.  Patient reports no pain during urination, no incontinence, no difficulty urinating,  no hematuria, no increased frequency, no feelings of urgency, no flank pains and no urinary tract infections  Patient reports no muscle aches, no muscle weakness, no muscle cramps, no arthralgias/joint pain, no back pains, no swelling in the extremities and no difficulty walking.  Patient reports in dependency.  Patient reports no loss of consciousness, no slurred speech, no weakness, no numbness, no tingling, no tremors, no seizures, no dizziness, no headaches, no memory lapses or changes, no difficulty finding desired words, no loss of balance or falls  Patient reports no irritability, no depression, no anxiety, no panic attacks, no sleep disturbances, feeling safe in her relationship, no paranoia and no thoughts about suicide  Patient reports no fatigue, no cold intolerance, no heat intolerance and no unusual body odor  Patient reports no bruising, no swollen glands, no clotting problems, and no bleeding disorders    Review for Opioid Screening: Pt does not have Rx for Opioids (If patient has Rx list here)      Review for Substance Use Disorders: Patient does not use substance (If patient has Rx list here)           Objective:      Physical Exam  Physical Exam:  Patient is a  year old   Constitutional:  General appearance:  Healthy appearing, well nourished, well developed, and well groomed.  Level of distress:  NAD.  Ambulation:  Ambulating normally.  Psychiatric:  Insight:  Good judgment.  Mental status:  Normal mood and affect an active and alert.  Orientation:  To time, place and person.  Memory:  Recent memory  Normal and remote memory normal.  Head:  Normocephalic, atraumatic, symmetrical, no tenderness, and hair is evenly distributed.  Eyes:  Lids and conjunctivae:  No discharge, pallor or redness and noninjected.  Pupils:  Kept it PERRLA.  Corneas: Grossly intact and fluorescein stain normal.  Funduscopic examination:  Normal vessels and optic discs, no exudates or hemorrhages and grossly normal except  where noted.  Capital E OM: Intact.  Lungs: Clear.  Sclera: Nonicteric.  Vision: Peripheral vision grossly intact in acuity grossly intact.  Optic disc:  Normal appearance and vessels and no exudates or hemorrhages  ENMT:  Ears:  No lesions on external ear, EACs clear.  TMs clear.  TM mobility normal and normal general appearance.  Hearing:  No hearing loss and Rinne: AC > BC.  Nose:  No polyps, lesions on external nose, septal deviation, sinus tenderness, or nasal discharge; nasal passages clear mucosa pink; and nares patent.  Lips teeth and gums: No mouth or lip ulcers or bleeding.  Gums are normal dentition normal.  Oropharynx:  No erythema exudates or ulcers and moist mucous membranes.  Tonsils not enlarged.  Oral mucosa and salivary glands normal  Neck:  Neck is supple, symmetrical, trachea midline and no masses.  Lymph nodes:  No cervical lymphadenopathy.  Thyroid no enlargement or nodules and nontender  Lungs:  Respiratory effort no dyspnea.  Percussion: No dullness, flatness or hyper-resonance.  Auscultation:  No wheezing, rales/crackles, no rhonchi and breath sounds normal, good air movement, and clear to auscultation except as noted.  Chest deformity: normal thoracic no deformities  Cardiovascular:  Apical pulse: Nondisplaced.  Heart auscultation:  Normal S1 and S2; no murmurs rubs or gallops; and RRR.  Neck vessels: No carotid bruit on the right or left and no JVDs or hepatojugular reflux.  Arterial pulses normal on the right and left radial femoral and dorsalis pedis and posterior tibial.  Varicosities right and left non-existent and capillary refill test normal.  Edema none: on the right or left  Breast: not applicable  Abdomen:  Bowel sounds normal.  Inspection and palpation:  No tenderness guarding masses rebound tenderness or CVA tenderness and soft and nondistended.  Liver column nontender and no hepatomegaly.  Spleen:  Nontender and no splenomegaly.  Hernia: Nonpalpable  Male :  Deferred  Rectal:   No hemorrhoids fissures masses and normal tone and stool heme-negative  Musculoskeletal:  Motor strength and tone:  Normal and normal tone.  Joints bones and muscles:  No contractures malalignment, tenderness or bony abnormalities and normal movement of all extremities and posture is normal.  Extremities:  No cyanosis clubbing or palpable cords  Neurological examination: Gait and station:  Normal gait and station.  Cranial nerves:  Grossly intact.  Sensation:  Monofilament test intact and normal and grossly intact.  Reflexes:  DTRs 2+ bilaterally throughout.  Coordination and cerebellum: no intention tremors, no resting tremors. Romberg's sign: negative. Motor strength normal on the right and left.  Sensation normal on the right and left side.  No pain/temperature decrease on the legs and dorsum of the feet.  No tactile decreased on the leg and dorsum of the feet.  No vibration- perception threshold decrease  Skin:  Inspection and palpation: No rash, lesions, ulcers, nodules, jaundice or abnormal nevi and good turgor.  Nails:  Normal.  Right and left lower extremities column normal  Back: Thoracolumbar appearance: Normal curvature  Foot examination:  Right and left feet were examined, and toes were examined:  No deformity, inter digital erythema, or nail changes or disorders and digital hair present and normal motion.     I offered to discuss end of life issues, including information on how to make advance directives that the patient could use to name someone who would make medical decisions on their behalf if they became too ill to make themselves.      __Patient declined       _X__Patient is interested, I provided paperwork and offered to discuss     Cognition assessment :  ---------------------------------  Oriented times 3  Mini mental status exam : within normal limits    Assessment:       1. Welcome to Medicare preventive visit  Overview:  Initial [place]     Assessment & Plan:  I gave the patient written  recommendations re the outstanding vaccinations with the AVS  Diet , wt loss , exercise d/w patient  I gave the patient a copy of the AMD  We discussed tobacco, ETOH & physical activity  Dep screen : neg  Order abd US to screen for AAA  Prevnar 20 today    Orders:  -     IN OFFICE EKG 12-LEAD (to [facility])    2. Encounter for abdominal aortic aneurysm (AAA) screening  -     US Abdominal Aorta; Future; Expected date: 01/07/2025    3. Essential hypertension, benign  -     Comprehensive Metabolic Panel; Future; Expected date: 01/07/2025  -     Microalbumin/Creatinine Ratio, Urine; Future; Expected date: 01/07/2025    4. Benign prostatic hyperplasia without lower urinary tract symptoms  -     Prostate Specific Antigen, Diagnostic; Future; Expected date: 01/07/2025    5. Mixed hyperlipidemia  Overview:  Dyslipidemia: Patient presents for evaluation of lipids.  Compliance with treatment thus far has been good.  A repeat fasting lipid profile was done.  The patient does use medications that may worsen dyslipidemias (corticosteroids, progestins, anabolic steroids, diuretics, beta-blockers, amiodarone, cyclosporine, olanzapine). The patient exercises every other day.    The patient is not known to have coexisting coronary artery disease.     Cardiac Risk Factors  Age > 45-male, > 55-female:  YES  +1   Smoking:   No   Sig. family hx of CHD*:  NO   Hypertension:   NO   Diabetes:   NO   HDL < 35:   NO   HDL > 59:   NO   Total: 1   *- Sig. family h/o CHD per NCEP = MI or sudden death at <56yo in   father or other 1st-degree male relative, or <66yo in mother or   other 1st-degree female relative        Orders:  -     Lipid Panel; Future; Expected date: 01/07/2025    6. Elevated blood sugar  -     Comprehensive Metabolic Panel; Future; Expected date: 01/07/2025  -     Hemoglobin A1C; Future; Expected date: 01/07/2025    7. Primary hypothyroidism  Overview:  Much better labs    Assessment & Plan:  Cont same dose of 125  mcg    Orders:  -     TSH; Future; Expected date: 01/07/2025    8. Need for prophylactic vaccination against Streptococcus pneumoniae (pneumococcus) and influenza  -     pneumoc 20-xuan conj-dip cr(PF) (PREVNAR-20 (PF)) injection Syrg 0.5 mL    9. Primary insomnia  Overview:  Stable    Assessment & Plan:  Ambien refilled last week             Plan:       Assessment & Plan    IMPRESSION:  - Reviewed patient's recent MRI and X-ray of right hip  - Noted Kenalog injection for right hip from Dr. Martinez provided relief  - Assessed cardiovascular risk factors, including past smoking history  - Evaluated thyroid function, noting significant improvement in thyroid levels  - Determined Ambien as more appropriate than Alprazolam for patient's sleep needs    RIGHT HIP PAIN:  - Evaluated the patient's right hip problems through MRI and X-ray.  - Administered a Kenalog (steroid) injection for right hip pain management.    ANEURYSM SCREENING:  - Confirmed the patient's past smoking history.  - Ordered an ultrasound of the aorta to screen for aneurysm due to past smoking history.  - Confirmed the patient's current status of no tobacco use.    MEDICARE VISIT:  - Performed Medicare visit and asked screening questions.  - Measured the patient's blood pressure.  - Reviewed the patient's fall risk, smoking status, alcohol use, and physical activity.  - Screened for depression.  - Discussed the importance of estate planning and medical power of .  - Attached information about health agent appointment to the patient's portal for Medicare compliance.    HYPOTHYROIDISM:  - Reviewed thyroid function test results.  - Noted improvement in thyroid function.  - Continued Synthroid 125 mcg for hypothyroidism management.    INSOMNIA:  - Discussed sleep medication options with the patient.  - Prescribed Zolpidem (Ambien) for insomnia with a 30-day supply and 2 refills, sufficient until early March.    PHYSICAL ACTIVITY:  - Mitchell cast  "continue golfing 3-4 times per week as form of physical activity.    MEDICATIONS/SUPPLEMENTS:  - Continued other current medications including Valtrex, Xanaflex, Simvastatin, Gabapentin, Propecia, Volterangell, Celebrex, and Viagra (patient to refill as needed through pharmacy or [facility]).    FOLLOW UP:  - Instructed the patient to follow up after scheduling ultrasound at [facility], preferably on a non-golfing day.       Mitchell Fontana" was seen today for medication refill and annual exam.    Diagnoses and all orders for this visit:    Welcome to Medicare preventive visit  -     IN OFFICE EKG 12-LEAD (to [facility])    Encounter for abdominal aortic aneurysm (AAA) screening  -     US Abdominal Aorta; Future    Essential hypertension, benign  -     Comprehensive Metabolic Panel; Future  -     Microalbumin/Creatinine Ratio, Urine; Future    Benign prostatic hyperplasia without lower urinary tract symptoms  -     Prostate Specific Antigen, Diagnostic; Future    Mixed hyperlipidemia  -     Lipid Panel; Future    Elevated blood sugar  -     Comprehensive Metabolic Panel; Future  -     Hemoglobin A1C; Future    Primary hypothyroidism  -     TSH; Future    Need for prophylactic vaccination against Streptococcus pneumoniae (pneumococcus) and influenza  -     pneumoc 20-xuan conj-dip cr(PF) (PREVNAR-20 (PF)) injection Syrg 0.5 mL    Primary insomnia            Advance Care Planning     Date: 01/07/2025    Power of   I initiated the process of voluntary advance care planning today and explained the importance of this process to the patient.  I introduced the concept of advance directives to the patient, as well. Then the patient received detailed information about the importance of designating a Health Care Power of  (HCPOA). He was also instructed to communicate with this person about their wishes for future healthcare, should he become sick and lose decision-making capacity. The patient has not " previously appointed a HCPOA. After our discussion, the patient has decided to complete a HCPOA and has appointed his friend as his  health care agent:  Bonifacio Uriarte  & health care agent number:  [phone] . I encouraged him to communicate with this person about their wishes for future healthcare, should he become sick and lose decision-making capacity.      A total of 16 min was spent on advance care planning, goals of care discussion, emotional support, formulating and communicating prognosis and exploring burden/benefit of various approaches of treatment. This discussion occurred on a fully voluntary basis with the verbal consent of the patient and/or family.

## 2025-01-09 LAB
OHS QRS DURATION: 82 MS
OHS QTC CALCULATION: 396 MS

## 2025-01-14 ENCOUNTER — HOSPITAL ENCOUNTER (OUTPATIENT)
Dept: RADIOLOGY | Facility: HOSPITAL | Age: 65
Discharge: HOME | End: 2025-01-14
Attending: INTERNAL MEDICINE
Payer: MEDICARE

## 2025-01-14 DIAGNOSIS — Z13.6 ENCOUNTER FOR ABDOMINAL AORTIC ANEURYSM (AAA) SCREENING: ICD-10-CM

## 2025-01-14 PROCEDURE — 76775 US EXAM ABDO BACK WALL LIM: CPT | Mod: TC

## 2025-01-14 PROCEDURE — 76775 US EXAM ABDO BACK WALL LIM: CPT | Mod: 26,,, | Performed by: RADIOLOGY

## 2025-01-31 RX ORDER — CELECOXIB 200 MG/1
CAPSULE ORAL
Qty: 90 CAPSULE | Refills: 3 | Status: SHIPPED | OUTPATIENT
Start: 2025-01-31

## 2025-01-31 NOTE — TELEPHONE ENCOUNTER
Unable to retrieve patient chart and identify care due.  [facility] Embedded Care Due Messages. Reference number: [id number].   1/31/2025 12:17:18 AM CST

## 2025-01-31 NOTE — TELEPHONE ENCOUNTER
Refill Routing Note   Medication(s) are not appropriate for processing by [facility] for the following reason(s):        Outside of protocol    [facility] action(s):  Route               Appointments  past 12m or future 3m with PCP    Date Provider   Last Visit   1/7/2025 Toni Leigh MD   Next Visit   Visit date not found Toni Leigh MD   ED visits in past 90 days: 0        Note composed:1:54 AM 01/31/2025

## 2025-02-25 ENCOUNTER — TELEPHONE (OUTPATIENT)
Dept: FAMILY MEDICINE | Facility: CLINIC | Age: 65
End: 2025-02-25
Payer: MEDICARE

## 2025-02-25 RX ORDER — SIMVASTATIN 20 MG/1
20 TABLET, FILM COATED ORAL NIGHTLY
Qty: 90 TABLET | Refills: 0 | Status: SHIPPED | OUTPATIENT
Start: 2025-02-25 | End: 2025-02-25 | Stop reason: SDUPTHER

## 2025-02-25 RX ORDER — SIMVASTATIN 20 MG/1
20 TABLET, FILM COATED ORAL NIGHTLY
Qty: 90 TABLET | Refills: 3 | Status: SHIPPED | OUTPATIENT
Start: 2025-02-25 | End: 2025-02-25 | Stop reason: SDUPTHER

## 2025-02-25 RX ORDER — SIMVASTATIN 20 MG/1
20 TABLET, FILM COATED ORAL NIGHTLY
Qty: 90 TABLET | Refills: 0 | Status: SHIPPED | OUTPATIENT
Start: 2025-02-25 | End: 2025-05-26

## 2025-02-25 NOTE — TELEPHONE ENCOUNTER
No care due was identified.  [facility] Embedded Care Due Messages. Reference number: [id number].   2/25/2025 12:15:49 AM CST

## 2025-02-25 NOTE — TELEPHONE ENCOUNTER
----- Message from Toni Leigh MD sent at 2/25/2025 10:22 AM CST -----  Regarding: f/u  Paxton duong f/u in MayTy

## 2025-02-25 NOTE — TELEPHONE ENCOUNTER
Refill Decision Note   Mitchell Hernandez  is requesting a refill authorization.  Brief Assessment and Rationale for Refill:  Approve     Medication Therapy Plan:         Comments:     Note composed:2:32 AM 02/25/2025

## 2025-03-23 RX ORDER — LEVOTHYROXINE SODIUM 125 UG/1
125 TABLET ORAL
Qty: 90 TABLET | Refills: 3 | Status: SHIPPED | OUTPATIENT
Start: 2025-03-23

## 2025-03-23 NOTE — TELEPHONE ENCOUNTER
Refill Decision Note   Mitchell Hernandez  is requesting a refill authorization.  Brief Assessment and Rationale for Refill:  Approve     Medication Therapy Plan: TSH-WNL      Comments:     Note composed:10:32 AM 03/23/2025

## 2025-03-23 NOTE — TELEPHONE ENCOUNTER
Care Due:                  Date            Visit Type   Department     Provider  --------------------------------------------------------------------------------                                EP -                              PRIMARY      [facility]  Last Visit: 01-      CARE ([facility])   GERDA Leigh                              EP -                              PRIMARY      [facility]  Next Visit: 05-      CARE ([facility])   GERDA Leigh                                                            Last  Test          Frequency    Reason                     Performed    Due Date  --------------------------------------------------------------------------------    CBC.........  12 months..  celecoxib, diclofenac,     Not Found    Overdue                             valACYclovir.............    [employer] Embedded Care Due Messages. Reference number: [id number].   3/23/2025 12:22:15 AM CDT

## 2025-04-01 DIAGNOSIS — G47.00 INSOMNIA, UNSPECIFIED TYPE: Primary | ICD-10-CM

## 2025-04-01 RX ORDER — ZOLPIDEM TARTRATE 10 MG/1
10 TABLET ORAL NIGHTLY PRN
Qty: 30 TABLET | Refills: 2 | Status: SHIPPED | OUTPATIENT
Start: 2025-04-01 | End: 2025-06-30

## 2025-04-01 NOTE — TELEPHONE ENCOUNTER
No care due was identified.  [facility] Embedded Care Due Messages. Reference number: [id number].   4/01/2025 10:16:20 AM CDT

## 2025-04-01 NOTE — TELEPHONE ENCOUNTER
Last office visit: 1/7/2025  ----- Message from Tran sent at 4/1/2025 10:18 AM CDT -----  Contact: Patient  Type:  RX Refill RequestWho Called: PatientRefill or New Rx:refillRX Name and Strength:zolpidem (AMBIEN) 10 mg TabHow is the patient currently taking it? (ex. 1XDay): 1 x day Is this a 30 day or 90 day RX:30Preferred Pharmacy with phone number:[facility]/pharmacy #[id number] - [place], [place] - [address] AT [address] [address]: [phone] Fax: [phone] or Mail Order:localOrdering Provider:Daad Would the patient rather a call back or a response via [facility]? [facility] Call Back Number:[phone] Information: Please call to advise

## 2025-04-06 DIAGNOSIS — L65.9 HAIR LOSS: ICD-10-CM

## 2025-04-06 RX ORDER — FINASTERIDE 1 MG/1
1 TABLET, FILM COATED ORAL
Qty: 90 TABLET | Refills: 3 | Status: SHIPPED | OUTPATIENT
Start: 2025-04-06

## 2025-04-06 NOTE — TELEPHONE ENCOUNTER
No care due was identified.  [facility] Embedded Care Due Messages. Reference number: [id number].   4/06/2025 12:20:16 AM CDT

## 2025-04-06 NOTE — TELEPHONE ENCOUNTER
Refill Routing Note   Medication(s) are not appropriate for processing by [facility] for the following reason(s):        Outside of protocol    [facility] action(s):  Approve  Route             Appointments  past 12m or future 3m with PCP    Date Provider   Last Visit   1/7/2025 Toni Leigh MD   Next Visit   5/13/2025 Toni Leigh MD   ED visits in past 90 days: 0        Note composed:9:59 AM 04/06/2025

## 2025-04-07 RX ORDER — MINOXIDIL 2.5 MG/1
2.5 TABLET ORAL
Qty: 90 TABLET | Refills: 3 | Status: SHIPPED | OUTPATIENT
Start: 2025-04-07

## 2025-04-28 RX ORDER — VALACYCLOVIR HYDROCHLORIDE 500 MG/1
500 TABLET, FILM COATED ORAL
Qty: 9 TABLET | Refills: 0 | Status: SHIPPED | OUTPATIENT
Start: 2025-04-28 | End: 2025-05-01

## 2025-04-28 RX ORDER — VALACYCLOVIR HYDROCHLORIDE 500 MG/1
500 TABLET, FILM COATED ORAL
COMMUNITY
End: 2025-04-28 | Stop reason: SDUPTHER

## 2025-05-05 ENCOUNTER — TELEPHONE (OUTPATIENT)
Dept: FAMILY MEDICINE | Facility: CLINIC | Age: 65
End: 2025-05-05

## 2025-05-15 ENCOUNTER — OFFICE VISIT (OUTPATIENT)
Dept: FAMILY MEDICINE | Facility: CLINIC | Age: 65
End: 2025-05-15
Payer: MEDICARE

## 2025-05-15 VITALS
SYSTOLIC BLOOD PRESSURE: 108 MMHG | HEIGHT: 70 IN | OXYGEN SATURATION: 98 % | WEIGHT: 178.25 LBS | DIASTOLIC BLOOD PRESSURE: 80 MMHG | BODY MASS INDEX: 25.52 KG/M2 | HEART RATE: 76 BPM

## 2025-05-15 DIAGNOSIS — R73.9 ELEVATED BLOOD SUGAR: ICD-10-CM

## 2025-05-15 DIAGNOSIS — B00.9 HERPES SIMPLEX: ICD-10-CM

## 2025-05-15 DIAGNOSIS — I10 ESSENTIAL HYPERTENSION, BENIGN: ICD-10-CM

## 2025-05-15 DIAGNOSIS — Z00.00 HEALTHCARE MAINTENANCE: ICD-10-CM

## 2025-05-15 DIAGNOSIS — E78.2 MIXED HYPERLIPIDEMIA: ICD-10-CM

## 2025-05-15 DIAGNOSIS — E03.9 PRIMARY HYPOTHYROIDISM: ICD-10-CM

## 2025-05-15 DIAGNOSIS — J06.9 VIRAL UPPER RESPIRATORY TRACT INFECTION: Primary | ICD-10-CM

## 2025-05-15 DIAGNOSIS — N40.0 BENIGN PROSTATIC HYPERPLASIA WITHOUT LOWER URINARY TRACT SYMPTOMS: ICD-10-CM

## 2025-05-15 PROCEDURE — 84153 ASSAY OF PSA TOTAL: CPT | Performed by: INTERNAL MEDICINE

## 2025-05-15 RX ORDER — PROMETHAZINE HYDROCHLORIDE AND DEXTROMETHORPHAN HYDROBROMIDE 6.25; 15 MG/5ML; MG/5ML
5 SYRUP ORAL EVERY 6 HOURS PRN
Qty: 5 ML | Refills: 0 | Status: SHIPPED | OUTPATIENT
Start: 2025-05-15

## 2025-05-15 RX ORDER — VALACYCLOVIR HYDROCHLORIDE 1 G/1
1000 TABLET, FILM COATED ORAL DAILY
Qty: 5 TABLET | Refills: 2 | Status: SHIPPED | OUTPATIENT
Start: 2025-05-15 | End: 2025-05-30

## 2025-05-15 RX ORDER — SIMVASTATIN 20 MG/1
20 TABLET, FILM COATED ORAL NIGHTLY
Qty: 90 TABLET | Refills: 1 | Status: SHIPPED | OUTPATIENT
Start: 2025-05-15 | End: 2025-11-11

## 2025-05-15 RX ORDER — PROMETHAZINE HYDROCHLORIDE AND DEXTROMETHORPHAN HYDROBROMIDE 6.25; 15 MG/5ML; MG/5ML
5 SYRUP ORAL EVERY 6 HOURS PRN
Qty: 5 ML | Refills: 0 | Status: SHIPPED | OUTPATIENT
Start: 2025-05-15 | End: 2025-05-15

## 2025-05-15 RX ORDER — PROMETHAZINE HYDROCHLORIDE AND DEXTROMETHORPHAN HYDROBROMIDE 6.25; 15 MG/5ML; MG/5ML
5 SYRUP ORAL EVERY 6 HOURS PRN
COMMUNITY
Start: 2025-05-05 | End: 2025-05-15 | Stop reason: SDUPTHER

## 2025-05-15 RX ORDER — GABAPENTIN 300 MG/1
300 CAPSULE ORAL 2 TIMES DAILY
Qty: 180 CAPSULE | Refills: 1 | Status: SHIPPED | OUTPATIENT
Start: 2025-05-15 | End: 2025-11-11

## 2025-05-15 NOTE — PROGRESS NOTES
"Subjective:       Patient ID: Mitchell Hernandez is a 64 y.o. male.    Chief Complaint: Health Maintenance (Pt went to  on last Monday here for F/U/)      History of Present Illness    CHIEF COMPLAINT:  Mitchell presents for follow-up of persistent sinus symptoms and to discuss medication refills.    HPI:  Mitchell reports sinus symptoms for the past 10 days, including a stuffy head and popping ears. He was evaluated at urgent care 10 days ago, receiving a steroid injection and Doxycycline prescription. He was also given cough syrup, which helped significantly at night but has since run out. He is currently taking an OTC mucus decongestant, which he believes is working to some extent. This sinus issue occurs seasonally for him.    Mitchell has tennis elbow, affecting his ability to play pickleball, though he continues to play. He also has shoulder issues, noting that reaching out could potentially dislocate his shoulder. He had an MRI on his shoulder last Friday and was evaluated by Dr. Guerrier at [address] [facility]. He is scheduled to see Dr. De Anda at the [facility] in July regarding potential shoulder replacement surgery, initially suggested 5 years ago.    Mitchell reports occasional herpes outbreaks, for which he uses Valtrex. He prefers to treat these outbreaks as they occur rather than taking daily preventive medication.    Mitchell denies having fever or pneumonia.    MEDICATIONS:  Mitchell is on Simvastatin taken nightly, Levothyroxine (mentioned as "Synthroid"), Propecia, and Gabapentin twice daily. He takes Celebrex as needed for pain, which was previously taken daily. He is also on Minoxidil, Ambien, and Viagra as needed. Mitchell uses OTC Mucinex for mucus and congestion.    MEDICAL HISTORY:  Mitchell has a history of seasonal sinusitis, tennis elbow, and herpes with occasional outbreaks.    IMAGING:  Mitchell underwent an MRI of the shoulder last Friday. The results of this imaging study were " not specified.    SOCIAL HISTORY:  Occupation: Semi-retired         Review of Systems   Constitutional:  Negative for activity change, appetite change and fever.   HENT:  Positive for nasal congestion.    Respiratory:  Positive for cough.    Cardiovascular: Negative.    Gastrointestinal: Negative.    Musculoskeletal: Negative.          Objective:      Physical Exam  Vitals and nursing note reviewed.   Constitutional:       Appearance: Normal appearance.   HENT:      Head: Normocephalic and atraumatic.      Right Ear: Tympanic membrane normal.      Left Ear: Tympanic membrane normal.      Nose: Nose normal.      Mouth/Throat:      Mouth: Mucous membranes are dry.      Pharynx: No oropharyngeal exudate or posterior oropharyngeal erythema.   Cardiovascular:      Pulses: Normal pulses.      Heart sounds: Normal heart sounds.   Pulmonary:      Effort: Pulmonary effort is normal.      Breath sounds: Normal breath sounds.   Musculoskeletal:      Cervical back: Normal range of motion and neck supple. No rigidity or tenderness.   Lymphadenopathy:      Cervical: No cervical adenopathy.   Neurological:      Mental Status: He is alert.         Assessment:       1. Viral upper respiratory tract infection  Overview:  No f/c  + cough , congestion for few days  No sore throat/ swollen glands      Assessment & Plan:  Increase your intake of fluids and stay hydrated  Use over-the-counter cold and sinus medicine for example DayQuil for daytime ,NyQuil for nighttime  Please add Tylenol , Aleve or Advil as needed for low-grade temperatures and soreness  Get enough rest  No need for treatment with antibiotics at the current time as your symptoms and lack of temp point to a viral or allergic disease  Please call us back or return if fever develops or symptoms progress        2. Primary hypothyroidism  Overview:  Much better labs    Orders:  -     TSH; Future; Expected date: 05/15/2025    3. Benign prostatic hyperplasia without lower  urinary tract symptoms  -     Prostate Specific Antigen, Diagnostic; Future; Expected date: 05/15/2025    4. Essential hypertension, benign  -     Comprehensive Metabolic Panel; Future; Expected date: 05/15/2025    5. Mixed hyperlipidemia  Overview:  Dyslipidemia: Patient presents for evaluation of lipids.  Compliance with treatment thus far has been good.  A repeat fasting lipid profile was done.  The patient does use medications that may worsen dyslipidemias (corticosteroids, progestins, anabolic steroids, diuretics, beta-blockers, amiodarone, cyclosporine, olanzapine). The patient exercises every other day.    The patient is not known to have coexisting coronary artery disease.     Cardiac Risk Factors  Age > 45-male, > 55-female:  YES  +1   Smoking:   No   Sig. family hx of CHD*:  NO   Hypertension:   NO   Diabetes:   NO   HDL < 35:   NO   HDL > 59:   NO   Total: 1   *- Sig. family h/o CHD per NCEP = MI or sudden death at <54yo in   father or other 1st-degree male relative, or <66yo in mother or   other 1st-degree female relative        Assessment & Plan:  Patient has concern about side effects of the statin in general and because a friend of his had some kind of problem with his statin  Patient will let us know what kind of problem and the blood tests that the other friends doctor order  I suspect it could have been statin induced myositis in the other person  Patient himself is asymptomatic  We are going to monitor and address any new or future concerns if they happen    Orders:  -     Lipid Panel; Future; Expected date: 05/15/2025    6. Elevated blood sugar  -     Hemoglobin A1C; Future; Expected date: 05/15/2025    7. Healthcare maintenance  Overview:  H/o of Shoulder and other joint arthritis  Recurrent flares of genital herpes  Hair loss  Hypothyroidism  Hyperlipidemia  Insomnia  ED    Assessment & Plan:  Check his PSA today  Schedule a rest of his blood work before his next visit  Refilled his  simvastatin  Refilled Valtrex  Refill gabapentin      8. Herpes simplex  Overview:  With occasional flares of his genital lesions    Assessment & Plan:  Add Valtrex 1000 mg daily for 5 days per flare      Other orders  -     promethazine-dextromethorphan (PROMETHAZINE-DM) 6.25-15 mg/5 mL Syrp; Take 5 mLs by mouth every 6 (six) hours as needed (cough).  Dispense: 5 mL; Refill: 0  -     valACYclovir (VALTREX) 1000 MG tablet; Take 1 tablet (1,000 mg total) by mouth once daily. for 15 days  Dispense: 5 tablet; Refill: 2  -     simvastatin (ZOCOR) 20 MG tablet; Take 1 tablet (20 mg total) by mouth every evening.  Dispense: 90 tablet; Refill: 1  -     gabapentin (NEURONTIN) 300 MG capsule; Take 1 capsule (300 mg total) by mouth 2 (two) times daily.  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  - Mitchell reports persistent stuffy head with ears popping despite receiving steroid injection and antibiotics 10 days ago.  - Prescribed refill of cough syrup for nighttime symptoms and advised using OTC mucus decongestant for symptom relief.  - Recommend trying Mucinex 3-in-1 throat lozenges.  - Explained that viral infections and allergies can cause prolonged symptoms lasting weeks, and that antibiotics are ineffective for these conditions.    ## LATERAL EPICONDYLITIS:  - Mitchell reports having lateral  "epicondylitis and plays pickleball.    ## RECURRENT SHOULDER DISLOCATION:  - Mitchell reports shoulder dislocates when reaching outward.  - Reviewed recent MRI performed last Friday.  - Mitchell is scheduled to see Dr. De Anda at [facility] [place].  - Discussed possible shoulder arthroplasty as a treatment option.    ## HERPES INFECTION:  - Mitchell reports occasional herpes outbreaks.  - Discussed treatment options: daily prophylactic low dose versus therapeutic course during flares.  - Opted for as-needed dosing rather than daily prophylaxis.  - Prescribed valacyclovir with instructions to take 1 tablet daily for 5 days during flares, with 2 refills provided for episodic treatment.    ## HYPOTHYROIDISM:  - Continued levothyroxine (Levaksin) for thyroid management.  - Educated patient that thyroid medication is a replacement for what the body does not produce.    ## LONG TERM USE OF NSAIDS:  - Continued celecoxib with instructions to take only as needed, not daily, to protect renal function due to potential nephrotoxicity.  - Provided a 1-year supply of medication.    ## OTHER MEDICATIONS AND MANAGEMENT:  - Reviewed and refilled current medications including simvastatin, gabapentin, propecia, minoxidil, and Ambien.  - Discussed potential side effects of statins, particularly muscle pain, though patient reports no current issues.  - Advised about enzyme tests that can be done if statin-related symptoms occur.  - Instructed patient to use portal [employer] to request Ambien refill when needed.    ## FOLLOW-UP AND LABS:  - Ordered labs to be completed 1-2 days before next appointment in November.  - Follow up in 6 months.       Mitchell Fontana" was seen today for health maintenance.    Diagnoses and all orders for this visit:    Viral upper respiratory tract infection    Primary hypothyroidism  -     TSH; Future    Benign prostatic hyperplasia without lower urinary tract symptoms  -     Prostate Specific Antigen, " Diagnostic; Future    Essential hypertension, benign  -     Comprehensive Metabolic Panel; Future    Mixed hyperlipidemia  -     Lipid Panel; Future    Elevated blood sugar  -     Hemoglobin A1C; Future    Healthcare maintenance    Herpes simplex    Other orders  -     promethazine-dextromethorphan (PROMETHAZINE-DM) 6.25-15 mg/5 mL Syrp; Take 5 mLs by mouth every 6 (six) hours as needed (cough).  -     valACYclovir (VALTREX) 1000 MG tablet; Take 1 tablet (1,000 mg total) by mouth once daily. for 15 days  -     simvastatin (ZOCOR) 20 MG tablet; Take 1 tablet (20 mg total) by mouth every evening.  -     gabapentin (NEURONTIN) 300 MG capsule; Take 1 capsule (300 mg total) by mouth 2 (two) times daily.          Visit today included increased complexity associated with the care of the episodic problem.   I addressed and managing the longitudinal care of the patient due to the serious and/or complex managed problem(s).  .

## 2025-05-15 NOTE — ASSESSMENT & PLAN NOTE
Check his PSA today  Schedule a rest of his blood work before his next visit  Refilled his simvastatin  Refilled Valtrex  Refill gabapentin

## 2025-05-15 NOTE — ASSESSMENT & PLAN NOTE
Patient has concern about side effects of the statin in general and because a friend of his had some kind of problem with his statin  Patient will let us know what kind of problem and the blood tests that the other friends doctor order  I suspect it could have been statin induced myositis in the other person  Patient himself is asymptomatic  We are going to monitor and address any new or future concerns if they happen

## 2025-05-15 NOTE — TELEPHONE ENCOUNTER
Refill Routing Note   Medication(s) are not appropriate for processing by [facility] for the following reason(s):        Outside of protocol    [facility] action(s):  Route             Appointments  past 12m or future 3m with PCP    Date Provider   Last Visit   5/15/2025 Toni Leigh MD   Next Visit   Visit date not found Toni Leigh MD   ED visits in past 90 days: 0        Note composed:10:22 AM 05/15/2025

## 2025-06-13 ENCOUNTER — OFFICE VISIT (OUTPATIENT)
Dept: FAMILY MEDICINE | Facility: CLINIC | Age: 65
End: 2025-06-13
Payer: MEDICARE

## 2025-06-13 ENCOUNTER — LAB VISIT (OUTPATIENT)
Dept: LAB | Facility: HOSPITAL | Age: 65
End: 2025-06-13
Payer: MEDICARE

## 2025-06-13 VITALS
BODY MASS INDEX: 25.79 KG/M2 | HEART RATE: 70 BPM | WEIGHT: 180.13 LBS | HEIGHT: 70 IN | OXYGEN SATURATION: 96 % | DIASTOLIC BLOOD PRESSURE: 70 MMHG | SYSTOLIC BLOOD PRESSURE: 116 MMHG

## 2025-06-13 DIAGNOSIS — Z01.818 PREOPERATIVE CLEARANCE: Primary | ICD-10-CM

## 2025-06-13 DIAGNOSIS — Z01.818 PREOPERATIVE CLEARANCE: ICD-10-CM

## 2025-06-13 DIAGNOSIS — Z87.891 FORMER SMOKER: ICD-10-CM

## 2025-06-13 LAB
ABSOLUTE EOSINOPHIL (OHS): 0.04 K/UL
ABSOLUTE MONOCYTE (OHS): 0.74 K/UL (ref 0.3–1)
ABSOLUTE NEUTROPHIL COUNT (OHS): 3.79 K/UL (ref 1.8–7.7)
ALBUMIN SERPL BCP-MCNC: 4.1 G/DL (ref 3.5–5.2)
ALP SERPL-CCNC: 62 UNIT/L (ref 40–150)
ALT SERPL W/O P-5'-P-CCNC: 11 UNIT/L (ref 10–44)
ANION GAP (OHS): 8 MMOL/L (ref 8–16)
AST SERPL-CCNC: 20 UNIT/L (ref 11–45)
BASOPHILS # BLD AUTO: 0.06 K/UL
BASOPHILS NFR BLD AUTO: 1 %
BILIRUB SERPL-MCNC: 0.6 MG/DL (ref 0.1–1)
BUN SERPL-MCNC: 10 MG/DL (ref 8–23)
CALCIUM SERPL-MCNC: 9.5 MG/DL (ref 8.7–10.5)
CHLORIDE SERPL-SCNC: 102 MMOL/L (ref 95–110)
CO2 SERPL-SCNC: 25 MMOL/L (ref 23–29)
CREAT SERPL-MCNC: 0.8 MG/DL (ref 0.5–1.4)
ERYTHROCYTE [DISTWIDTH] IN BLOOD BY AUTOMATED COUNT: 14.6 % (ref 11.5–14.5)
GFR SERPLBLD CREATININE-BSD FMLA CKD-EPI: >60 ML/MIN/1.73/M2
GLUCOSE SERPL-MCNC: 93 MG/DL (ref 70–110)
HCT VFR BLD AUTO: 39.7 % (ref 40–54)
HGB BLD-MCNC: 13.2 GM/DL (ref 14–18)
IMM GRANULOCYTES # BLD AUTO: 0.02 K/UL (ref 0–0.04)
IMM GRANULOCYTES NFR BLD AUTO: 0.3 % (ref 0–0.5)
LYMPHOCYTES # BLD AUTO: 1.12 K/UL (ref 1–4.8)
MCH RBC QN AUTO: 32.3 PG (ref 27–31)
MCHC RBC AUTO-ENTMCNC: 33.2 G/DL (ref 32–36)
MCV RBC AUTO: 97 FL (ref 82–98)
NUCLEATED RBC (/100WBC) (OHS): 0 /100 WBC
OHS QRS DURATION: 82 MS
OHS QTC CALCULATION: 406 MS
PLATELET # BLD AUTO: 201 K/UL (ref 150–450)
PMV BLD AUTO: 9.9 FL (ref 9.2–12.9)
POTASSIUM SERPL-SCNC: 4.5 MMOL/L (ref 3.5–5.1)
PROT SERPL-MCNC: 8 GM/DL (ref 6–8.4)
RBC # BLD AUTO: 4.09 M/UL (ref 4.6–6.2)
RELATIVE EOSINOPHIL (OHS): 0.7 %
RELATIVE LYMPHOCYTE (OHS): 19.4 % (ref 18–48)
RELATIVE MONOCYTE (OHS): 12.8 % (ref 4–15)
RELATIVE NEUTROPHIL (OHS): 65.8 % (ref 38–73)
SODIUM SERPL-SCNC: 135 MMOL/L (ref 136–145)
WBC # BLD AUTO: 5.77 K/UL (ref 3.9–12.7)

## 2025-06-13 PROCEDURE — 99999 PR PBB SHADOW E&M-EST. PATIENT-LVL III: CPT | Mod: PBBFAC,,, | Performed by: FAMILY MEDICINE

## 2025-06-13 PROCEDURE — 80053 COMPREHEN METABOLIC PANEL: CPT

## 2025-06-13 PROCEDURE — 85025 COMPLETE CBC W/AUTO DIFF WBC: CPT

## 2025-06-13 PROCEDURE — 99213 OFFICE O/P EST LOW 20 MIN: CPT | Mod: PBBFAC,PN | Performed by: FAMILY MEDICINE

## 2025-06-13 PROCEDURE — 36415 COLL VENOUS BLD VENIPUNCTURE: CPT | Mod: PN

## 2025-06-13 NOTE — PROGRESS NOTES
Subjective     Patient ID: Mitchell Hernandez is a 64 y.o. male.    Chief Complaint: Pre-op Exam (CBC and BMP//L knee meniscus repair- Dr. Shoaib Reid), Establish Care, and EKG for pre-op    Preop clearance left meniscal tear repair scheduled for next Wednesday.    Patient has tolerated general anesthesia in the past chronic conditions are stable.  Mets greater than 4.    Patient's PCP is no longer affiliated with our organization.  Patient to determine if he will follow up PCP for primary care      Review of Systems   Constitutional:  Negative for activity change, appetite change, fatigue and fever.   Respiratory:  Negative for cough, chest tightness, shortness of breath and wheezing.    Cardiovascular:  Negative for chest pain, palpitations, leg swelling and claudication.   Gastrointestinal:  Negative for abdominal pain, constipation and diarrhea.   Psychiatric/Behavioral:  Negative for dysphoric mood, sleep disturbance and suicidal ideas. The patient is not nervous/anxious.           Objective     Physical Exam  Vitals reviewed.   Constitutional:       General: He is not in acute distress.     Appearance: Normal appearance. He is not ill-appearing.   Cardiovascular:      Rate and Rhythm: Normal rate and regular rhythm.      Heart sounds: Normal heart sounds.   Pulmonary:      Effort: Pulmonary effort is normal. No respiratory distress.      Breath sounds: Normal breath sounds.   Abdominal:      General: Bowel sounds are normal.      Palpations: Abdomen is soft.      Tenderness: There is no abdominal tenderness.   Musculoskeletal:      Cervical back: Neck supple.   Neurological:      General: No focal deficit present.      Mental Status: He is alert and oriented to person, place, and time.   Psychiatric:         Mood and Affect: Mood normal.         Behavior: Behavior normal.         Thought Content: Thought content normal.            Assessment and Plan     1. Preoperative clearance  -     EKG 12-lead  -      Comprehensive Metabolic Panel; Future; Expected date: 06/13/2025  -     CBC Auto Differential; Future; Expected date: 06/13/2025    2. Former smoker      Pt denies any h/o MI, angina, CHF, arrhythmias, severe valvular disease, DM or renal insuffiencey. This individual is at low risk for cardiovascular event during the low risk surgery. The revised cardiovascular risk index is <2 and is associated with a <7 percent risk of major cardiovascular events.  He does have the ability to perform > 4 MET levels of activity and has no active cardiac conditions. He may proceed with surgery with no additional cardiac testing or procedures.   Sincerely,  Dr. Baeza

## 2025-06-16 ENCOUNTER — RESULTS FOLLOW-UP (OUTPATIENT)
Dept: FAMILY MEDICINE | Facility: CLINIC | Age: 65
End: 2025-06-16

## 2025-06-29 DIAGNOSIS — G47.00 INSOMNIA, UNSPECIFIED TYPE: ICD-10-CM

## 2025-06-29 RX ORDER — ZOLPIDEM TARTRATE 10 MG/1
10 TABLET ORAL NIGHTLY PRN
Qty: 30 TABLET | Refills: 2 | Status: CANCELLED | OUTPATIENT
Start: 2025-06-29 | End: 2025-09-27

## 2025-07-01 ENCOUNTER — OFFICE VISIT (OUTPATIENT)
Dept: FAMILY MEDICINE | Facility: CLINIC | Age: 65
End: 2025-07-01
Payer: MEDICARE

## 2025-07-01 VITALS
HEIGHT: 70 IN | OXYGEN SATURATION: 99 % | HEART RATE: 75 BPM | WEIGHT: 177.5 LBS | BODY MASS INDEX: 25.41 KG/M2 | SYSTOLIC BLOOD PRESSURE: 90 MMHG | DIASTOLIC BLOOD PRESSURE: 52 MMHG

## 2025-07-01 DIAGNOSIS — G47.00 INSOMNIA, UNSPECIFIED TYPE: ICD-10-CM

## 2025-07-01 PROCEDURE — G2211 COMPLEX E/M VISIT ADD ON: HCPCS | Mod: S$GLB,,, | Performed by: STUDENT IN AN ORGANIZED HEALTH CARE EDUCATION/TRAINING PROGRAM

## 2025-07-01 PROCEDURE — 99214 OFFICE O/P EST MOD 30 MIN: CPT | Mod: S$GLB,,, | Performed by: STUDENT IN AN ORGANIZED HEALTH CARE EDUCATION/TRAINING PROGRAM

## 2025-07-01 RX ORDER — GABAPENTIN 300 MG/1
300 CAPSULE ORAL NIGHTLY
COMMUNITY

## 2025-07-01 RX ORDER — TIZANIDINE HYDROCHLORIDE 2 MG/1
2 CAPSULE, GELATIN COATED ORAL
COMMUNITY

## 2025-07-01 RX ORDER — ZOLPIDEM TARTRATE 10 MG/1
10 TABLET ORAL NIGHTLY PRN
Qty: 90 TABLET | Refills: 0 | Status: SHIPPED | OUTPATIENT
Start: 2025-07-01 | End: 2025-09-29

## 2025-07-01 RX ORDER — CELECOXIB 100 MG/1
100 CAPSULE ORAL 2 TIMES DAILY
COMMUNITY

## 2025-07-01 NOTE — PROGRESS NOTES
"  [facility]  [address], [address]  [place] [address]    Subjective     Patient ID: Mitchell Hernandez is a 64 y.o. male who comes to the clinic to establish care.    Chief Complaint: Establish Care    Insomnia - takes ambien 10mg nightly    Hypothyroidism - takes synthroid 125mcg daily    HLD - takes simvastatin 20mg nightly    ED - takes viagra 100mg PRN    Hair loss - takes propecia 1mg daily    ROS negative unless stated above       Objective     Vitals:    07/01/25 0821   BP: (!) 90/52   BP Location: Left arm   Patient Position: Sitting   Pulse: 75   SpO2: 99%   Weight: 80.5 kg (177 lb 7.5 oz)   Height: 5' 10" (1.778 m)        Wt Readings from Last 3 Encounters:   07/01/25 0821 80.5 kg (177 lb 7.5 oz)   06/13/25 1041 81.7 kg (180 lb 1.9 oz)   05/15/25 0943 80.8 kg (178 lb 3.9 oz)        Physical Exam  Vitals reviewed.   Constitutional:       Appearance: Normal appearance.   HENT:      Head: Normocephalic and atraumatic.   Eyes:      Extraocular Movements: Extraocular movements intact.      Pupils: Pupils are equal, round, and reactive to light.   Cardiovascular:      Rate and Rhythm: Normal rate.      Pulses: Normal pulses.      Heart sounds: Normal heart sounds.   Pulmonary:      Effort: Pulmonary effort is normal. No respiratory distress.      Breath sounds: Normal breath sounds.   Musculoskeletal:         General: Normal range of motion.      Cervical back: Normal range of motion and neck supple.   Skin:     General: Skin is dry.      Capillary Refill: Capillary refill takes less than 2 seconds.   Neurological:      General: No focal deficit present.      Mental Status: He is alert and oriented to person, place, and time. Mental status is at baseline.   Psychiatric:         Mood and Affect: Mood normal.         Behavior: Behavior normal.         Thought Content: Thought content normal.         Current Outpatient Medications   Medication " Instructions    celecoxib (CELEBREX) 100 mg, 2 times daily    diclofenac sodium (VOLTAREN) 1 g, Topical (Top), 4 times daily PRN    finasteride (PROPECIA) 1 mg, Oral    gabapentin (NEURONTIN) 300 mg, Nightly    levothyroxine (SYNTHROID) 125 mcg, Oral, Before breakfast    minoxidiL (LONITEN) 2.5 mg, Oral    sildenafiL (VIAGRA) 100 mg, 2 times daily PRN    simvastatin (ZOCOR) 20 mg, Oral, Nightly    tiZANidine 2 mg, As needed (PRN)    valACYclovir (VALTREX) 1,000 mg, Oral, Daily    zolpidem (AMBIEN) 10 mg, Oral, Nightly PRN           Assessment and Plan     1. Insomnia, unspecified type  -     zolpidem (AMBIEN) 10 mg Tab; Take 1 tablet (10 mg total) by mouth nightly as needed (sleep).  Dispense: 90 tablet; Refill: 0        Here to establish care    For insomnia, refilling ambien    For HLD, continue simvastatin    The visit today included increased complexity associated with the care of an episodic problem, namely HLD, that was addressed and managed via longitudinal care.    RTC in 3 months, ambien    I encouraged the patient to take all medications as prescribed and to keep follow up appointments with their providers. ED precautions given more concerning issues. Questions were invited and answered. Patient stated they had no other concerns. Follow up sooner if needed.     Rashad Funez MD  07/01/2025 8:03 AM